# Patient Record
Sex: FEMALE | Race: WHITE | NOT HISPANIC OR LATINO | Employment: FULL TIME | URBAN - METROPOLITAN AREA
[De-identification: names, ages, dates, MRNs, and addresses within clinical notes are randomized per-mention and may not be internally consistent; named-entity substitution may affect disease eponyms.]

---

## 2017-01-02 ENCOUNTER — OFFICE VISIT (OUTPATIENT)
Dept: WOUND CARE | Facility: MEDICAL CENTER | Age: 27
End: 2017-01-02
Attending: ORTHOPAEDIC SURGERY
Payer: COMMERCIAL

## 2017-01-02 DIAGNOSIS — T81.89XS NON-HEALING SURGICAL WOUND, SEQUELA: ICD-10-CM

## 2017-01-02 DIAGNOSIS — A49.02 INFECTION OF WOUND DUE TO METHICILLIN RESISTANT STAPHYLOCOCCUS AUREUS (MRSA): ICD-10-CM

## 2017-01-02 PROCEDURE — A6212 FOAM DRG <=16 SQ IN W/BORDER: HCPCS

## 2017-01-02 PROCEDURE — 97602 WOUND(S) CARE NON-SELECTIVE: CPT

## 2017-01-02 PROCEDURE — 302717 HCHG ABSORBANT-ANTI MICROBIAL 7 DAY

## 2017-01-02 PROCEDURE — A6402 STERILE GAUZE <= 16 SQ IN: HCPCS

## 2017-01-02 RX ORDER — SULFAMETHOXAZOLE AND TRIMETHOPRIM 800; 160 MG/1; MG/1
1 TABLET ORAL 2 TIMES DAILY
Qty: 30 TAB | Refills: 0 | Status: SHIPPED | OUTPATIENT
Start: 2017-01-02 | End: 2017-01-05

## 2017-01-02 NOTE — WOUND TEAM
Advanced Wound Care  [facility]  [address]  [address]  [place] [address]  [phone] Fax: [phone]      Encounter Note:   For Certification Period: 12/14/16 - 1/14/17      Referring Physician: WM NIKIA Teixeira MD [phone]  Primary Physician:    Sweetie English NP    Consulting Physicians:     Dr. JERAD Boo    Wound(s): L&R Bunion  M20.11, M20.12    Start of Care:     12/14/16  Subjective:        HPI:          Patient had R. Foot bunion removed on 10/13/16, has had delayed wound healing, was on antibiotics but has completed abx course at this time. Patient is following up with Dr. Teixeira today.     Pain:   2/10 on 0-10 pain scale    Past Medical History:  Carpal tunnel syndrome    Current Medications:  No current antibiotics  Birth control  Adderall    Allergies:   NKDA    Past Surgical History:   Osteotomy, metatarsal - 10/13/16      Social History:    Never Smoker      Objective:    Tests and Measures: DP and PT pulses strong palpable, Hair growth to RLE present, foot warm and cap refill WNL  12/30/16 c and s taken  Orthotic, protective, supportive devices: No longer in surgical boot per MD, wearing Hookas currently     Wound Characteristics                                                    Location:  R. Medial dorsal foot Initial Evaluation  Date: 12/14/16 12/16/2016 Encounter Note: 1/2/17   Tissue Type and %: 100% dry brown 25% yellow slough, 75% pink  10% yellow slough, 70% red viable, 20% exposed plate   Periwound: Scattered erythema Scattered erythema Moderate  Erythema, dermatitis under tape   Drainage: None Min ss Min ss   Exposed structures NA None Metal plate;    Wound Edges:   Closed intact intact   Odor: None None none   S&S of Infection:   Erythema, delayed healing Slight erythema Erythema, pain, heat to area   Edema: Trace pedal slight Localized     Sensation: Intact intact Intact                      Measurements:  R. Medial Dorsal Foot Initial Evaluation  Date:  12/14/16 Encounter Note: 12/26/2016   Length (cm) 4.5 4.0   Width (cm) 1.3 1.0   Depth (cm) NA 1.0   Area (cm2) 5.85 cm2 4.0 cm 2   Tract/undermine  none        Procedures:  Wound culture obtained today 12/30 and sent to lab - positive for MRSA   Debridement :  Non selective with gauze and cotton tip applicator to remove loose biofilm   Cleansed with:       NS and no rinse foam cleanser to foot                                                                   Periwound protected with: benzoin,    Primary dressing: acti 7, barrier paste and biatain foam,   Other: Tubi E, with hole cut out for tubing  1/2/17 vac hold due to periwound irritation     Patient Education:   Professional Collaboration:  1/4/17 - spoke to Dr Teixeira re infection   1/5/16 pt to see Dr Teixeira followed by an appt here to replace the VAC   1/2/16 Dr Enrrique Graves in to address wound culture and placed on bactrim.    Assessment:      Wound etiology: Surgical    Wound Progress:  Increased viable tissue, increased erythema, wound culture obtained.    Rationale for Treatment: NPWT to assist with granular tissue formation, control exudate    Patient tolerance/compliance: Patient understanding wound care instructions and appears motivated to comply.    Complicating factors: Possible infection    Need for ongoing Advanced Wound Care services: Patient requires CSWD PRN and wound evaluation to monitor for s/s of infection.      Plan:      Treatment Plan and Recommendations:    Frequency: 2x/week      Treatment Goals: STG 2 Weeks  LTG 4 Weeks   Granulation Tissue: % 30 % 60   Decrease Necrotic Tissue to: % 70 % 40   Wound Phase:  proliferation proliferation   Decrease Size by: % 0 % 20   Periwound:  intact intact   Decrease tracts/undermining by: % NA % NA   Decrease Pain:  NA NA                        At the time of each visit a thorough assessment of the patient is completed to assure the  appropriateness of our plan of care.  The dressings or modalities may  need to be adapted   from the original plan to address any significant changes in the wound environment.          Clinician Signature:_______________________________Date__________________      Physician Signature:______________________________Date:__________________

## 2017-01-04 ENCOUNTER — NON-PROVIDER VISIT (OUTPATIENT)
Dept: WOUND CARE | Facility: MEDICAL CENTER | Age: 27
End: 2017-01-04
Attending: ORTHOPAEDIC SURGERY
Payer: COMMERCIAL

## 2017-01-04 PROCEDURE — 97602 WOUND(S) CARE NON-SELECTIVE: CPT

## 2017-01-04 PROCEDURE — A6402 STERILE GAUZE <= 16 SQ IN: HCPCS

## 2017-01-04 PROCEDURE — A6212 FOAM DRG <=16 SQ IN W/BORDER: HCPCS

## 2017-01-04 NOTE — WOUND TEAM
Advanced Wound Care  [facility]  [address]  [address]  [place] [address]  [phone] Fax: [phone]      Encounter Note:   For Certification Period: 12/14/16 - 1/14/17      Referring Physician: WM NIKIA Teixeira MD [phone]  Primary Physician:    Sweetie English NP    Consulting Physicians:     Dr. JERAD Boo    Wound(s): L&R Bunion  M20.11, M20.12    Start of Care:     12/14/16  Subjective:        HPI:   Patient had R. Foot bunion removed on 10/13/16, has had delayed wound healing, was on antibiotics but has completed abx course at this time. Patient is following up with Dr. Teixeira today.     Pain:  Generalized pain; no pain in wound.     Current Medications:  Patient on abx as of 1/2/17-was not able to tolerate and is now off of all meds per Dr. Teixeira.    Allergies:   NKDA      Objective:    Tests and Measures: DP and PT pulses strong palpable, Hair growth to RLE present, foot warm and cap refill WNL  12/30/16 c and s taken  Orthotic, protective, supportive devices: No longer in surgical boot per MD, wearing Hookas currently     Wound Characteristics                                                    Location:  R. Medial dorsal foot Initial Evaluation  Date: 12/14/16 12/16/2016 Encounter Note: 1/4/17   Tissue Type and %: 100% dry brown 25% yellow slough, 75% pink  10% yellow slough, 70% red viable, 20% exposed plate   Periwound: Scattered erythema Scattered erythema Moderate  Erythema, dermatitis under tape   Drainage: None Min ss Min ss   Exposed structures NA None Metal plate   Wound Edges:   Closed intact intact   Odor: None None none   S&S of Infection:   Erythema, delayed healing Slight erythema Erythema, wound + for MRSA   Edema: Trace pedal slight Localized     Sensation: Intact intact Intact                      Measurements:  R. Medial Dorsal Foot Initial Evaluation  Date: 12/14/16 Encounter Note: 12/26/2016 Encounter Note:  1/4/17   Length (cm) 4.5 4.0 3.8   Width (cm) 1.3 1.0  1.0   Depth (cm) NA 1.0 0.3   Area (cm2) 5.85 cm2 4.0 cm 2 3.8   Tract/undermine  none None        Procedures:  Wound culture obtained today 12/30 and sent to lab - positive for MRSA   Debridement :  Non selective with NS, gauze and cotton tipped applicator to remove loose biofilm   Cleansed with:   NS                                                                 Periwound protected with: No sting skin prep, zinc moisture barrier cream    Primary dressing: acticoat 7              Secondary dressing: Ad foam    Other: Tubi E  1/4/17 vac held again per Ana THOMPSON due to infection     Patient Education: Reinforced pt instr on ss infection - erythema, edema, localized heat, fever/chills/N+V, when to call MD/go to ER. Explained to patient rationale for holding the wound vac, discussed the wound culture with patient and DONNA Jama. Patient states that Dr. Teixeira is holding all of her meds at this time pending surgery on Tuesday 1/10/17 to remove hardware in foot. Patient verbalizes understanding to all instructions.   Called Dr. Teixeira to report patient has MRSA infection and that we are holding the wound vac at this time. Spoke to Dr. Teixeira' MA Alondra who stated that Dr. Teixeira is aware of MRSA infection and that he wants to have her off of all medications prior to her surgery. Patient states that Dr. Teixeira advised her to call at any hour if needed. Let patient know that if fevers start to get higher than 101.5 or she has nausea and/or vomiting that she needs to go to the ER immediately. Patient verbalizes understanding to all instructions. Faxed over lab report for the wound culture.      Professional Collaboration: None today    1/5/16 pt to see Dr Teixeira followed by an appt here to replace the VAC    Assessment:      Wound etiology: Surgical    Wound Progress:  Increased viable tissue, increased erythema, wound culture + for MRSA.    Rationale for Treatment: Acticoat 7 for antimicrobial  properties    Patient tolerance/compliance: Patient understanding wound care instructions and appears motivated to comply.    Complicating factors: Possible infection    Need for ongoing Advanced Wound Care services: Patient requires CSWD PRN and wound evaluation to monitor for s/s of infection.      Plan:      Treatment Plan and Recommendations:    Frequency: 2x/week      Treatment Goals: STG 2 Weeks  LTG 4 Weeks   Granulation Tissue: % 30 % 60   Decrease Necrotic Tissue to: % 70 % 40   Wound Phase:  proliferation proliferation   Decrease Size by: % 0 % 20   Periwound:  intact intact   Decrease tracts/undermining by: % NA % NA   Decrease Pain:  NA NA                        At the time of each visit a thorough assessment of the patient is completed to assure the  appropriateness of our plan of care.  The dressings or modalities may need to be adapted   from the original plan to address any significant changes in the wound environment.          Clinician Signature:_______________________________Date__________________      Physician Signature:______________________________Date:__________________

## 2017-01-05 ENCOUNTER — APPOINTMENT (OUTPATIENT)
Dept: WOUND CARE | Facility: MEDICAL CENTER | Age: 27
End: 2017-01-05
Attending: ORTHOPAEDIC SURGERY
Payer: COMMERCIAL

## 2017-01-05 DIAGNOSIS — Z01.812 PRE-OPERATIVE LABORATORY EXAMINATION: ICD-10-CM

## 2017-01-05 LAB
ALBUMIN SERPL BCP-MCNC: 2.9 G/DL (ref 3.2–4.9)
ALBUMIN/GLOB SERPL: 0.9 G/DL
ALP SERPL-CCNC: 110 U/L (ref 30–99)
ALT SERPL-CCNC: 40 U/L (ref 2–50)
ANION GAP SERPL CALC-SCNC: 7 MMOL/L (ref 0–11.9)
AST SERPL-CCNC: 43 U/L (ref 12–45)
BASOPHILS # BLD AUTO: 0.2 % (ref 0–1.8)
BASOPHILS # BLD: 0.01 K/UL (ref 0–0.12)
BILIRUB SERPL-MCNC: 0.5 MG/DL (ref 0.1–1.5)
BUN SERPL-MCNC: 11 MG/DL (ref 8–22)
CALCIUM SERPL-MCNC: 8.7 MG/DL (ref 8.4–10.2)
CHLORIDE SERPL-SCNC: 107 MMOL/L (ref 96–112)
CO2 SERPL-SCNC: 24 MMOL/L (ref 20–33)
CREAT SERPL-MCNC: 0.71 MG/DL (ref 0.5–1.4)
CRP SERPL HS-MCNC: 6.88 MG/DL (ref 0–0.75)
EOSINOPHIL # BLD AUTO: 0.4 K/UL (ref 0–0.51)
EOSINOPHIL NFR BLD: 7.8 % (ref 0–6.9)
ERYTHROCYTE [DISTWIDTH] IN BLOOD BY AUTOMATED COUNT: 43.8 FL (ref 35.9–50)
ERYTHROCYTE [SEDIMENTATION RATE] IN BLOOD BY WESTERGREN METHOD: 35 MM/HOUR (ref 0–20)
GFR SERPL CREATININE-BSD FRML MDRD: >60 ML/MIN/1.73 M 2
GLOBULIN SER CALC-MCNC: 3.2 G/DL (ref 1.9–3.5)
GLUCOSE SERPL-MCNC: 112 MG/DL (ref 65–99)
HCT VFR BLD AUTO: 37.6 % (ref 37–47)
HGB BLD-MCNC: 12.8 G/DL (ref 12–16)
IMM GRANULOCYTES # BLD AUTO: 0.04 K/UL (ref 0–0.11)
IMM GRANULOCYTES NFR BLD AUTO: 0.8 % (ref 0–0.9)
LYMPHOCYTES # BLD AUTO: 1.02 K/UL (ref 1–4.8)
LYMPHOCYTES NFR BLD: 19.8 % (ref 22–41)
MCH RBC QN AUTO: 30.8 PG (ref 27–33)
MCHC RBC AUTO-ENTMCNC: 34 G/DL (ref 33.6–35)
MCV RBC AUTO: 90.4 FL (ref 81.4–97.8)
MONOCYTES # BLD AUTO: 0.26 K/UL (ref 0–0.85)
MONOCYTES NFR BLD AUTO: 5.1 % (ref 0–13.4)
NEUTROPHILS # BLD AUTO: 3.41 K/UL (ref 2–7.15)
NEUTROPHILS NFR BLD: 66.3 % (ref 44–72)
NRBC # BLD AUTO: 0 K/UL
NRBC BLD AUTO-RTO: 0 /100 WBC
PLATELET # BLD AUTO: 225 K/UL (ref 164–446)
PMV BLD AUTO: 11 FL (ref 9–12.9)
POTASSIUM SERPL-SCNC: 3.6 MMOL/L (ref 3.6–5.5)
PROT SERPL-MCNC: 6.1 G/DL (ref 6–8.2)
RBC # BLD AUTO: 4.16 M/UL (ref 4.2–5.4)
SODIUM SERPL-SCNC: 138 MMOL/L (ref 135–145)
WBC # BLD AUTO: 5.1 K/UL (ref 4.8–10.8)

## 2017-01-05 PROCEDURE — 80053 COMPREHEN METABOLIC PANEL: CPT

## 2017-01-05 PROCEDURE — 86140 C-REACTIVE PROTEIN: CPT

## 2017-01-05 PROCEDURE — 36415 COLL VENOUS BLD VENIPUNCTURE: CPT

## 2017-01-05 PROCEDURE — 85652 RBC SED RATE AUTOMATED: CPT

## 2017-01-05 PROCEDURE — 85025 COMPLETE CBC W/AUTO DIFF WBC: CPT

## 2017-01-05 RX ORDER — MULTIVIT WITH MINERALS/LUTEIN
TABLET ORAL DAILY
COMMUNITY
End: 2022-10-13

## 2017-01-05 RX ORDER — LEVONORGESTREL AND ETHINYL ESTRADIOL 100-20(84)
KIT ORAL DAILY
COMMUNITY
End: 2022-10-13

## 2017-01-05 RX ORDER — ACETAMINOPHEN 325 MG/1
650 TABLET ORAL EVERY 4 HOURS PRN
COMMUNITY
End: 2017-04-12

## 2017-01-05 RX ORDER — AMPHETAMINE SULFATE 5 MG/1
2 TABLET ORAL DAILY
COMMUNITY
End: 2017-04-12

## 2017-01-06 NOTE — WOUND TEAM
Pt was waiting on hold to speak to clinician about abx.  Pt was no longer on the line when clinican came to the phone.  Returned called number on file for pt and left a message requesting a call back.

## 2017-01-07 ENCOUNTER — NON-PROVIDER VISIT (OUTPATIENT)
Dept: WOUND CARE | Facility: MEDICAL CENTER | Age: 27
End: 2017-01-07
Attending: ORTHOPAEDIC SURGERY
Payer: COMMERCIAL

## 2017-01-07 PROCEDURE — A6457 TUBULAR DRESSING: HCPCS

## 2017-01-07 PROCEDURE — 97602 WOUND(S) CARE NON-SELECTIVE: CPT

## 2017-01-07 PROCEDURE — A6402 STERILE GAUZE <= 16 SQ IN: HCPCS

## 2017-01-07 PROCEDURE — 303972 HCHG HYPAFIX RET DRST 18SQ PC"

## 2017-01-07 NOTE — WOUND TEAM
Advanced Wound Care  [facility]  [address]  [address]  [place] [address]  [phone] Fax: [phone]      Encounter Note:   For Certification Period: 12/14/16 - 1/14/17      Referring Physician: WM NIKIA Teixeira MD [phone]  Primary Physician:    Sweetie English NP    Consulting Physicians:     Dr. JERAD Boo    Wound(s): L&R Bunion  M20.11, M20.12    Start of Care:     12/14/16  Subjective:        HPI:   Patient had R. Foot bunion removed on 10/13/16, has had delayed wound healing, was on antibiotics but has completed abx course at this time. Patient is following up with Dr. Teixeira today.     Pain:  Denies pain today    Current Medications:  Patient on abx as of 1/2/17-was not able to tolerate and is now off of all meds per Dr. Teixeira.    Allergies:   NKDA      Objective:    Tests and Measures: DP and PT pulses strong palpable, Hair growth to RLE present, foot warm and cap refill WNL  12/30/16 c and s taken  Orthotic, protective, supportive devices: No longer in surgical boot per MD, wearing Hookas currently     Wound Characteristics                                                    Location:  R. Medial dorsal foot Initial Evaluation  Date: 12/14/16 12/16/2016 Encounter Note: 1/7/17   Tissue Type and %: 100% dry brown 25% yellow slough, 75% pink  15% yellow slough, 75% red viable, 10% exposed plate   Periwound: Scattered erythema Scattered erythema Moderate  erythema, edema    Drainage: None Min ss Min ss   Exposed structures NA None Metal plate   Wound Edges:   Closed intact intact   Odor: None None None   S&S of Infection:   Erythema, delayed healing Slight erythema Erythema, edema, wound + for MRSA   Edema: Trace pedal slight Localized 1+     Sensation: Intact intact Intact                      Measurements:  R. Medial Dorsal Foot Initial Evaluation  Date: 12/14/16 Encounter Note: 12/26/2016 Encounter Note:  1/4/17   Length (cm) 4.5 4.0 3.8   Width (cm) 1.3 1.0 1.0   Depth (cm) NA 1.0  0.3   Area (cm2) 5.85 cm2 4.0 cm 2 3.8   Tract/undermine  none None        Procedures:  Wound culture obtained today 12/30 and sent to lab - positive for MRSA   Debridement :  Non selective with NS, gauze and cotton tipped applicator to remove loose biofilm   Cleansed with:   NS                                                                 Periwound protected with: No sting skin prep, zinc moisture barrier cream    Primary dressing: Acticoat 7              Secondary dressing: Ad foam    Other: Tubi E  1/4/17 vac held again per Ana THOMPSON due to infection     Patient Education: Reinforced pt instr on ss infection - erythema, edema, localized heat, fever/chills/N+V, when to call MD/go to ER. Patient states that Dr. Teixeira is holding all of her meds at this time pending surgery on Tuesday 1/10/17 to remove hardware in foot. Patient verbalizes understanding to all instructions.   Patient states that Dr. Teixeira is on call if needed. Let patient know that if fevers start to get higher than 101.5, has nausea and/or vomiting, pain increases in wound, if the wound becomes more reddened that she needs to go to the ER immediately. Patient verbalizes understanding to all instructions.     Professional Collaboration: None today    Assessment:      Wound etiology: Surgical    Wound Progress:  Increased viable tissue, increased erythema, wound culture + for MRSA.    Rationale for Treatment: Acticoat 7 for antimicrobial properties    Patient tolerance/compliance: Patient understanding wound care instructions and appears motivated to comply.    Complicating factors: Possible infection    Need for ongoing Advanced Wound Care services: Patient requires CSWD PRN and wound evaluation to monitor for s/s of infection.      Plan:      Treatment Plan and Recommendations:    Frequency: 2x/week      Treatment Goals: STG 2 Weeks  LTG 4 Weeks   Granulation Tissue: % 30 % 60   Decrease Necrotic Tissue to: % 70 % 40   Wound Phase:   proliferation proliferation   Decrease Size by: % 0 % 20   Periwound:  intact intact   Decrease tracts/undermining by: % NA % NA   Decrease Pain:  NA NA                        At the time of each visit a thorough assessment of the patient is completed to assure the  appropriateness of our plan of care.  The dressings or modalities may need to be adapted   from the original plan to address any significant changes in the wound environment.          Clinician Signature:_______________________________Date__________________      Physician Signature:______________________________Date:__________________

## 2017-01-09 ENCOUNTER — APPOINTMENT (OUTPATIENT)
Dept: WOUND CARE | Facility: MEDICAL CENTER | Age: 27
End: 2017-01-09
Attending: ORTHOPAEDIC SURGERY
Payer: COMMERCIAL

## 2017-01-09 PROCEDURE — A6402 STERILE GAUZE <= 16 SQ IN: HCPCS

## 2017-01-09 PROCEDURE — A6212 FOAM DRG <=16 SQ IN W/BORDER: HCPCS

## 2017-01-09 PROCEDURE — 97602 WOUND(S) CARE NON-SELECTIVE: CPT

## 2017-01-09 PROCEDURE — A6457 TUBULAR DRESSING: HCPCS

## 2017-01-09 NOTE — WOUND TEAM
"Advanced Wound Care  [facility]  [address]  [address]  [place] [address]  [phone] Fax: [phone]      Encounter Note:   For Certification Period: 12/14/16 - 1/14/17      Referring Physician: WM NIKIA Teixeira MD [phone]  Primary Physician:    Sweetie English NP    Consulting Physicians:     Dr. JERAD Boo    Wound(s): L&R Bunion  M20.11, M20.12    Start of Care:     12/14/16  Subjective:        01/09/2017 - \"Dr. Teixeira is doing surgery tomorrow to take the hardware out\"    HPI:   Patient had R. Foot bunion removed on 10/13/16, has had delayed wound healing, was on antibiotics but has completed abx course at this time. Patient is following up with Dr. Teixeira today.     Pain:  Denies pain today    Current Medications:  Patient on abx as of 1/2/17-was not able to tolerate and is now off of all meds per Dr. Teixeira.    Allergies:   NKDA      Objective:    Tests and Measures: DP and PT pulses strong palpable, Hair growth to RLE present, foot warm and cap refill WNL  12/30/16 c and s taken  Orthotic, protective, supportive devices: No longer in surgical boot per MD, wearing Hookas currently     Wound Characteristics                                                    Location:  R. Medial dorsal foot Initial Evaluation  Date: 12/14/16 12/16/2016 Encounter Note: 1/9/17   Tissue Type and %: 100% dry brown 25% yellow slough, 75% pink  15% yellow slough, 75% red viable, 10% exposed plate   Periwound: Scattered erythema Scattered erythema minimal  erythema, minimal local edema    Drainage: None Min ss Min ss   Exposed structures NA None Hardware exposed   Wound Edges:   Closed intact intact   Odor: None None None   S&S of Infection:   Erythema, delayed healing Slight erythema Erythema, edema, wound + for MRSA   Edema: Trace pedal slight Localized 1+     Sensation: Intact intact Intact                      Measurements:  R. Medial Dorsal Foot Initial Evaluation  Date: 12/14/16 Encounter Note: " 12/26/2016 Encounter Note:  1/9/17   Length (cm) 4.5 4.0 3.5   Width (cm) 1.3 1.0 0.7   Depth (cm) NA 1.0 0.3   Area (cm2) 5.85 cm2 4.0 cm 2 2.45cm2   Tract/undermine  none None        Procedures:  Wound culture obtained today 12/30 and sent to lab - positive for MRSA   Debridement :  Non selective with NS, gauze and cotton tipped applicator to remove loose biofilm   Cleansed with:   NS                                                                 Periwound protected with: No sting skin prep, zinc moisture barrier cream    Primary dressing: Acticoat 7              Secondary dressing: Ad foam    Other: Tubi D  1/9/17 vac held - pt having surgery tomorrow     Patient Education: Pt instr we will likely use VAC after her surgery, unless MD is able to close her wound for primary intention healing. Instr her to call Dr. Puneet PEARSON, ask if she should bring VAC to surgical center tomorrow.    Reinforced pt instr on ss infection - erythema, edema, localized heat, fever/chills/N+V, when to call MD/go to ER. Patient states that Dr. Teixeira is holding all of her meds at this time pending surgery on Tuesday 1/10/17 to remove hardware in foot. Patient verbalizes understanding to all instructions.   Patient states that Dr. Teixeira is on call if needed. Let patient know that if fevers start to get higher than 101.5, has nausea and/or vomiting, pain increases in wound, if the wound becomes more reddened that she needs to go to the ER immediately. Patient verbalizes understanding to all instructions.     Professional Collaboration: None today    Assessment:      Wound etiology: Surgical    Wound Progress:  Increased viable tissue, increased erythema, wound culture + for MRSA.    Rationale for Treatment: Acticoat 7 for antimicrobial properties    Patient tolerance/compliance: Patient understanding wound care instructions and appears motivated to comply.    Complicating factors: Possible infection    Need for ongoing Advanced Wound Care  services: Patient requires CSWD PRN and wound evaluation to monitor for s/s of infection.      Plan:      Treatment Plan and Recommendations:    Frequency: 2x/week      Treatment Goals: STG 2 Weeks  LTG 4 Weeks   Granulation Tissue: % 30 % 60   Decrease Necrotic Tissue to: % 70 % 40   Wound Phase:  proliferation proliferation   Decrease Size by: % 0 % 20   Periwound:  intact intact   Decrease tracts/undermining by: % NA % NA   Decrease Pain:  NA NA                        At the time of each visit a thorough assessment of the patient is completed to assure the  appropriateness of our plan of care.  The dressings or modalities may need to be adapted   from the original plan to address any significant changes in the wound environment.          Clinician Signature:_______________________________Date__________________      Physician Signature:______________________________Date:__________________

## 2017-01-10 ENCOUNTER — HOSPITAL ENCOUNTER (INPATIENT)
Facility: MEDICAL CENTER | Age: 27
LOS: 1 days | DRG: 908 | End: 2017-01-11
Attending: ORTHOPAEDIC SURGERY | Admitting: ORTHOPAEDIC SURGERY
Payer: COMMERCIAL

## 2017-01-10 ENCOUNTER — APPOINTMENT (OUTPATIENT)
Dept: RADIOLOGY | Facility: MEDICAL CENTER | Age: 27
DRG: 908 | End: 2017-01-10
Attending: ORTHOPAEDIC SURGERY
Payer: COMMERCIAL

## 2017-01-10 ENCOUNTER — APPOINTMENT (OUTPATIENT)
Dept: RADIOLOGY | Facility: MEDICAL CENTER | Age: 27
DRG: 908 | End: 2017-01-10
Attending: INTERNAL MEDICINE
Payer: COMMERCIAL

## 2017-01-10 PROBLEM — T84.398A: Status: ACTIVE | Noted: 2017-01-10

## 2017-01-10 LAB
ANION GAP SERPL CALC-SCNC: 9 MMOL/L (ref 0–11.9)
BUN SERPL-MCNC: 9 MG/DL (ref 8–22)
CALCIUM SERPL-MCNC: 8.8 MG/DL (ref 8.4–10.2)
CHLORIDE SERPL-SCNC: 102 MMOL/L (ref 96–112)
CO2 SERPL-SCNC: 25 MMOL/L (ref 20–33)
CREAT SERPL-MCNC: 0.78 MG/DL (ref 0.5–1.4)
GFR SERPL CREATININE-BSD FRML MDRD: >60 ML/MIN/1.73 M 2
GLUCOSE SERPL-MCNC: 111 MG/DL (ref 65–99)
HCG UR QL: NEGATIVE
POTASSIUM SERPL-SCNC: 4.2 MMOL/L (ref 3.6–5.5)
SODIUM SERPL-SCNC: 136 MMOL/L (ref 135–145)
SP GR UR REFRACTOMETRY: 1.02

## 2017-01-10 PROCEDURE — 700111 HCHG RX REV CODE 636 W/ 250 OVERRIDE (IP): Performed by: ORTHOPAEDIC SURGERY

## 2017-01-10 PROCEDURE — 160039 HCHG SURGERY MINUTES - EA ADDL 1 MIN LEVEL 3: Performed by: ORTHOPAEDIC SURGERY

## 2017-01-10 PROCEDURE — 110382 HCHG SHELL REV 271: Performed by: ORTHOPAEDIC SURGERY

## 2017-01-10 PROCEDURE — 160009 HCHG ANES TIME/MIN: Performed by: ORTHOPAEDIC SURGERY

## 2017-01-10 PROCEDURE — 87205 SMEAR GRAM STAIN: CPT

## 2017-01-10 PROCEDURE — 110371 HCHG SHELL REV 272: Performed by: ORTHOPAEDIC SURGERY

## 2017-01-10 PROCEDURE — 36415 COLL VENOUS BLD VENIPUNCTURE: CPT

## 2017-01-10 PROCEDURE — 307732 DRAIN ACCESSORY KIT,15FR CHNL: Performed by: ORTHOPAEDIC SURGERY

## 2017-01-10 PROCEDURE — 87015 SPECIMEN INFECT AGNT CONCNTJ: CPT

## 2017-01-10 PROCEDURE — 700102 HCHG RX REV CODE 250 W/ 637 OVERRIDE(OP): Performed by: ORTHOPAEDIC SURGERY

## 2017-01-10 PROCEDURE — 501838 HCHG SUTURE GENERAL: Performed by: ORTHOPAEDIC SURGERY

## 2017-01-10 PROCEDURE — 700105 HCHG RX REV CODE 258: Performed by: ORTHOPAEDIC SURGERY

## 2017-01-10 PROCEDURE — 160036 HCHG PACU - EA ADDL 30 MINS PHASE I: Performed by: ORTHOPAEDIC SURGERY

## 2017-01-10 PROCEDURE — 700101 HCHG RX REV CODE 250

## 2017-01-10 PROCEDURE — 87186 SC STD MICRODIL/AGAR DIL: CPT

## 2017-01-10 PROCEDURE — 500049 HCHG BANDAGE, ACE ELASTIC 2: Performed by: ORTHOPAEDIC SURGERY

## 2017-01-10 PROCEDURE — 0QBN0ZZ EXCISION OF RIGHT METATARSAL, OPEN APPROACH: ICD-10-PCS | Performed by: ORTHOPAEDIC SURGERY

## 2017-01-10 PROCEDURE — 700111 HCHG RX REV CODE 636 W/ 250 OVERRIDE (IP)

## 2017-01-10 PROCEDURE — 770006 HCHG ROOM/CARE - MED/SURG/GYN SEMI*

## 2017-01-10 PROCEDURE — 160028 HCHG SURGERY MINUTES - 1ST 30 MINS LEVEL 3: Performed by: ORTHOPAEDIC SURGERY

## 2017-01-10 PROCEDURE — A4606 OXYGEN PROBE USED W OXIMETER: HCPCS | Performed by: ORTHOPAEDIC SURGERY

## 2017-01-10 PROCEDURE — 307736 CANISTER GO RENASYS 300ML: Performed by: ORTHOPAEDIC SURGERY

## 2017-01-10 PROCEDURE — 160035 HCHG PACU - 1ST 60 MINS PHASE I: Performed by: ORTHOPAEDIC SURGERY

## 2017-01-10 PROCEDURE — 3E0T3CZ INTRODUCTION OF REGIONAL ANESTHETIC INTO PERIPHERAL NERVES AND PLEXI, PERCUTANEOUS APPROACH: ICD-10-PCS | Performed by: ANESTHESIOLOGY

## 2017-01-10 PROCEDURE — 160022 HCHG BLOCK: Performed by: ORTHOPAEDIC SURGERY

## 2017-01-10 PROCEDURE — A9270 NON-COVERED ITEM OR SERVICE: HCPCS | Performed by: ORTHOPAEDIC SURGERY

## 2017-01-10 PROCEDURE — 87077 CULTURE AEROBIC IDENTIFY: CPT

## 2017-01-10 PROCEDURE — 160048 HCHG OR STATISTICAL LEVEL 1-5: Performed by: ORTHOPAEDIC SURGERY

## 2017-01-10 PROCEDURE — 160002 HCHG RECOVERY MINUTES (STAT): Performed by: ORTHOPAEDIC SURGERY

## 2017-01-10 PROCEDURE — 700105 HCHG RX REV CODE 258: Performed by: INTERNAL MEDICINE

## 2017-01-10 PROCEDURE — 0YP90YZ REMOVAL OF OTHER DEVICE FROM RIGHT LOWER EXTREMITY, OPEN APPROACH: ICD-10-PCS | Performed by: ORTHOPAEDIC SURGERY

## 2017-01-10 PROCEDURE — A6550 NEG PRES WOUND THER DRSG SET: HCPCS | Performed by: ORTHOPAEDIC SURGERY

## 2017-01-10 PROCEDURE — 87075 CULTR BACTERIA EXCEPT BLOOD: CPT

## 2017-01-10 PROCEDURE — 81025 URINE PREGNANCY TEST: CPT

## 2017-01-10 PROCEDURE — 80048 BASIC METABOLIC PNL TOTAL CA: CPT

## 2017-01-10 PROCEDURE — 87070 CULTURE OTHR SPECIMN AEROBIC: CPT

## 2017-01-10 PROCEDURE — 700111 HCHG RX REV CODE 636 W/ 250 OVERRIDE (IP): Performed by: INTERNAL MEDICINE

## 2017-01-10 RX ORDER — ACETAMINOPHEN 500 MG
1000 TABLET ORAL EVERY 6 HOURS
Status: DISCONTINUED | OUTPATIENT
Start: 2017-01-10 | End: 2017-01-11 | Stop reason: HOSPADM

## 2017-01-10 RX ORDER — ONDANSETRON 2 MG/ML
4 INJECTION INTRAMUSCULAR; INTRAVENOUS EVERY 4 HOURS PRN
Status: DISCONTINUED | OUTPATIENT
Start: 2017-01-10 | End: 2017-01-11 | Stop reason: HOSPADM

## 2017-01-10 RX ORDER — OXYCODONE HYDROCHLORIDE 5 MG/1
5 TABLET ORAL
Status: DISCONTINUED | OUTPATIENT
Start: 2017-01-10 | End: 2017-01-11 | Stop reason: HOSPADM

## 2017-01-10 RX ORDER — LEVONORGESTREL AND ETHINYL ESTRADIOL 100-20(84)
1 KIT ORAL DAILY
Status: DISCONTINUED | OUTPATIENT
Start: 2017-01-11 | End: 2017-01-11 | Stop reason: HOSPADM

## 2017-01-10 RX ORDER — ACETAMINOPHEN 325 MG/1
650 TABLET ORAL EVERY 4 HOURS PRN
Status: DISCONTINUED | OUTPATIENT
Start: 2017-01-10 | End: 2017-01-11 | Stop reason: HOSPADM

## 2017-01-10 RX ORDER — SODIUM CHLORIDE, SODIUM LACTATE, POTASSIUM CHLORIDE, CALCIUM CHLORIDE 600; 310; 30; 20 MG/100ML; MG/100ML; MG/100ML; MG/100ML
1000 INJECTION, SOLUTION INTRAVENOUS CONTINUOUS
Status: DISCONTINUED | OUTPATIENT
Start: 2017-01-10 | End: 2017-01-11 | Stop reason: HOSPADM

## 2017-01-10 RX ORDER — OXYCODONE HYDROCHLORIDE 5 MG/1
2.5 TABLET ORAL
Status: DISCONTINUED | OUTPATIENT
Start: 2017-01-10 | End: 2017-01-11 | Stop reason: HOSPADM

## 2017-01-10 RX ORDER — AMPHETAMINE SULFATE 5 MG/1
2 TABLET ORAL DAILY
Status: DISCONTINUED | OUTPATIENT
Start: 2017-01-11 | End: 2017-01-11 | Stop reason: HOSPADM

## 2017-01-10 RX ORDER — LIDOCAINE HYDROCHLORIDE 10 MG/ML
INJECTION, SOLUTION INFILTRATION; PERINEURAL
Status: COMPLETED
Start: 2017-01-10 | End: 2017-01-10

## 2017-01-10 RX ORDER — DIPHENHYDRAMINE HYDROCHLORIDE 50 MG/ML
INJECTION INTRAMUSCULAR; INTRAVENOUS
Status: COMPLETED
Start: 2017-01-10 | End: 2017-01-10

## 2017-01-10 RX ADMIN — LIDOCAINE HYDROCHLORIDE 0.1 ML: 10 INJECTION, SOLUTION INFILTRATION; PERINEURAL at 09:45

## 2017-01-10 RX ADMIN — DIPHENHYDRAMINE HYDROCHLORIDE 25 MG: 50 INJECTION, SOLUTION INTRAMUSCULAR; INTRAVENOUS at 11:34

## 2017-01-10 RX ADMIN — VANCOMYCIN HYDROCHLORIDE 700 MG: 10 INJECTION, POWDER, LYOPHILIZED, FOR SOLUTION INTRAVENOUS at 14:01

## 2017-01-10 RX ADMIN — SODIUM CHLORIDE, POTASSIUM CHLORIDE, SODIUM LACTATE AND CALCIUM CHLORIDE 1000 ML: 600; 310; 30; 20 INJECTION, SOLUTION INTRAVENOUS at 09:45

## 2017-01-10 RX ADMIN — DAPTOMYCIN 500 MG: 500 INJECTION, POWDER, LYOPHILIZED, FOR SOLUTION INTRAVENOUS at 16:19

## 2017-01-10 RX ADMIN — ACETAMINOPHEN 1000 MG: 500 TABLET ORAL at 23:47

## 2017-01-10 ASSESSMENT — LIFESTYLE VARIABLES
EVER HAD A DRINK FIRST THING IN THE MORNING TO STEADY YOUR NERVES TO GET RID OF A HANGOVER: NO
HOW MANY TIMES IN THE PAST YEAR HAVE YOU HAD 5 OR MORE DRINKS IN A DAY: 1
AVERAGE NUMBER OF DAYS PER WEEK YOU HAVE A DRINK CONTAINING ALCOHOL: 1
ON A TYPICAL DAY WHEN YOU DRINK ALCOHOL HOW MANY DRINKS DO YOU HAVE: 1
TOTAL SCORE: 0
TOTAL SCORE: 0
ALCOHOL_USE: YES
EVER_SMOKED: NEVER
CONSUMPTION TOTAL: POSITIVE
HAVE YOU EVER FELT YOU SHOULD CUT DOWN ON YOUR DRINKING: NO
EVER FELT BAD OR GUILTY ABOUT YOUR DRINKING: NO
TOTAL SCORE: 0
HAVE PEOPLE ANNOYED YOU BY CRITICIZING YOUR DRINKING: NO

## 2017-01-10 ASSESSMENT — PAIN SCALES - GENERAL
PAINLEVEL_OUTOF10: 0

## 2017-01-10 NOTE — IP AVS SNAPSHOT
Dotstudioz Access Code: [email or web address]  Expires: 2/10/2017  3:50 PM    Dotstudioz  A secure, online tool to manage your health information     [employer] Dotstudioz® is a secure, online tool that connects you to your personalized health information from the privacy of your home -- day or night - making it very easy for you to manage your healthcare. Once the activation process is completed, you can even access your medical information using the Dotstudioz cammie, which is available for free in the Apple Cammie store or Google Play store.     Dotstudioz provides the following levels of access (as shown below):   My Chart Features   [facility] Primary Care Doctor [facility]  Specialists [facility]  Urgent  Care Non-[facility]  Primary Care  Doctor   Email your healthcare team securely and privately 24/7 X X X X   Manage appointments: schedule your next appointment; view details of past/upcoming appointments X      Request prescription refills. X      View recent personal medical records, including lab and immunizations X X X X   View health record, including health history, allergies, medications X X X X   Read reports about your outpatient visits, procedures, consult and ER notes X X X X   See your discharge summary, which is a recap of your hospital and/or ER visit that includes your diagnosis, lab results, and care plan. X X       How to register for Dotstudioz:  1. Go to  [email or web address].  2. Click on the Sign Up Now box, which takes you to the New Member Sign Up page. You will need to provide the following information:  a. Enter your Dotstudioz Access Code exactly as it appears at the top of this page. (You will not need to use this code after you’ve completed the sign-up process. If you do not sign up before the expiration date, you must request a new code.)   b. Enter your date of birth.   c. Enter your home email address.   d. Click Submit, and follow the next screen’s instructions.  3. Create a Dotstudioz ID. This will be your Dotstudioz  login ID and cannot be changed, so think of one that is secure and easy to remember.  4. Create a Impact password. You can change your password at any time.  5. Enter your Password Reset Question and Answer. This can be used at a later time if you forget your password.   6. Enter your e-mail address. This allows you to receive e-mail notifications when new information is available in Impact.  7. Click Sign Up. You can now view your health information.    For assistance activating your Impact account, call [phone]

## 2017-01-10 NOTE — IP AVS SNAPSHOT
" After Visit Summary                                                                                                                  Name:Merna Ríos  Medical Record Number:[id number]  CSN: [id number]    YOB: 1990   Age: 26 y.o.  Sex: female  HT:1.753 m (5' 9\") WT: 69.3 kg (152 lb 12.5 oz)          Admit Date: 1/10/2017     Discharge Date:   Today's Date: 1/11/2017  Attending Doctor:  Kali Teixeira M*                  Allergies:  Cephalexin and Bactrim ds            Discharge Instructions       Discharge Instructions    Discharged to home by car with relative. Discharged via wheelchair, hospital escort: Yes.  Special equipment needed: Wound VAC    Be sure to schedule a follow-up appointment with your primary care doctor or any specialists as instructed.     Discharge Plan:   Influenza Vaccine Indication: Patient Refuses    I understand that a diet low in cholesterol, fat, and sodium is recommended for good health. Unless I have been given specific instructions below for another diet, I accept this instruction as my diet prescription.   Other diet: n/a    Special Instructions: None    · Is patient discharged on Warfarin / Coumadin?   No     · Is patient Post Blood Transfusion?  No    Depression / Suicide Risk    As you are discharged from this [facility] facility, it is important to learn how to keep safe from harming yourself.    Recognize the warning signs:  · Abrupt changes in personality, positive or negative- including increase in energy   · Giving away possessions  · Change in eating patterns- significant weight changes-  positive or negative  · Change in sleeping patterns- unable to sleep or sleeping all the time   · Unwillingness or inability to communicate  · Depression  · Unusual sadness, discouragement and loneliness  · Talk of wanting to die  · Neglect of personal appearance   · Rebelliousness- reckless behavior  · Withdrawal from people/activities they love  · Confusion- " "inability to concentrate     If you or a loved one observes any of these behaviors or has concerns about self-harm, here's what you can do:  · Talk about it- your feelings and reasons for harming yourself  · Remove any means that you might use to hurt yourself (examples: pills, rope, extension cords, firearm)  · Get professional help from the community (Mental Health, Substance Abuse, psychological counseling)  · Do not be alone:Call your Safe Contact- someone whom you trust who will be there for you.  · Call your local CRISIS HOTLINE 798-4136 or 121-345-9462  · Call your local Children's Mobile Crisis Response Team [place] (204) 341-2788 or www.HYLA Mobile  · Call the toll free National Suicide Prevention Hotlines   · National Suicide Prevention Lifeline 222-827-ITFC (8089)  · National Hope Line Network 800-SUICIDE (548-4553)    PICC Home Guide  A peripherally inserted central catheter (PICC) is a long, thin, flexible tube that is inserted into a vein in the upper arm. It is a form of intravenous (IV) access. It is considered to be a \"central\" line because the tip of the PICC ends in a large vein in your chest. This large vein is called the superior vena cava (SVC). The PICC tip ends in the SVC because there is a lot of blood flow in the SVC. This allows medicines and IV fluids to be quickly distributed throughout the body. The PICC is inserted using a sterile technique by a specially trained nurse or physician. After the PICC is inserted, a chest X-ray exam is done to be sure it is in the correct place.   A PICC may be placed for different reasons, such as:  · To give medicines and liquid nutrition that can only be given through a central line. Examples are:  ¨ Certain antibiotic treatments.  ¨ Chemotherapy.  ¨ Total parenteral nutrition (TPN).  · To take frequent blood samples.  · To give IV fluids and blood products.  · If there is difficulty placing a peripheral intravenous (PIV) catheter.  If taken " "care of properly, a PICC can remain in place for several months. A PICC can also allow a person to go home from the hospital early. Medicine and PICC care can be managed at home by a family member or home health care team.  WHAT PROBLEMS CAN HAPPEN WHEN I HAVE A PICC?  Problems with a PICC can occasionally occur. These may include the following:  · A blood clot (thrombus) forming in or at the tip of the PICC. This can cause the PICC to become clogged. A clot-dissolving medicine called tissue plasminogen activator (tPA) can be given through the PICC to help break up the clot.  · Inflammation of the vein (phlebitis) in which the PICC is placed. Signs of inflammation may include redness, pain at the insertion site, red streaks, or being able to feel a \"cord\" in the vein where the PICC is located.  · Infection in the PICC or at the insertion site. Signs of infection may include fever, chills, redness, swelling, or pus drainage from the PICC insertion site.  · PICC movement (malposition). The PICC tip may move from its original position due to excessive physical activity, forceful coughing, sneezing, or vomiting.  · A break or cut in the PICC. It is important to not use scissors near the PICC.  · Nerve or tendon irritation or injury during PICC insertion.  WHAT SHOULD I KEEP IN MIND ABOUT ACTIVITIES WHEN I HAVE A PICC?  · You may bend your arm and move it freely. If your PICC is near or at the bend of your elbow, avoid activity with repeated motion at the elbow.  · Rest at home for the remainder of the day following PICC line insertion.  · Avoid lifting heavy objects as instructed by your health care provider.  · Avoid using a crutch with the arm on the same side as your PICC. You may need to use a walker.  WHAT SHOULD I KNOW ABOUT MY PICC DRESSING?  · Keep your PICC bandage (dressing) clean and dry to prevent infection.  ¨ Ask your health care provider when you may shower. Ask your health care provider to teach you how " "to wrap the PICC when you do take a shower.  · Change the PICC dressing as instructed by your health care provider.  · Change your PICC dressing if it becomes loose or wet.  WHAT SHOULD I KNOW ABOUT PICC CARE?  · Check the PICC insertion site daily for leakage, redness, swelling, or pain.  · Do not take a bath, swim, or use hot tubs when you have a PICC. Cover PICC line with clear plastic wrap and tape to keep it dry while showering.  · Flush the PICC as directed by your health care provider. Let your health care provider know right away if the PICC is difficult to flush or does not flush. Do not use force to flush the PICC.  · Do not use a syringe that is less than 10 mL to flush the PICC.  · Never pull or tug on the PICC.  · Avoid blood pressure checks on the arm with the PICC.  · Keep your PICC identification card with you at all times.  · Do not take the PICC out yourself. Only a trained clinical professional should remove the PICC.  SEEK IMMEDIATE MEDICAL CARE IF:  · Your PICC is accidentally pulled all the way out. If this happens, cover the insertion site with a bandage or gauze dressing. Do not throw the PICC away. Your health care provider will need to inspect it.  · Your PICC was tugged or pulled and has partially come out. Do not  push the PICC back in.  · There is any type of drainage, redness, or swelling where the PICC enters the skin.  · You cannot flush the PICC, it is difficult to flush, or the PICC leaks around the insertion site when it is flushed.  · You hear a \"flushing\" sound when the PICC is flushed.  · You have pain, discomfort, or numbness in your arm, shoulder, or jaw on the same side as the PICC.  · You feel your heart \"racing\" or skipping beats.  · You notice a hole or tear in the PICC.  · You develop chills or a fever.  MAKE SURE YOU:   · Understand these instructions.  · Will watch your condition.  · Will get help right away if you are not doing well or get worse.     This information " is not intended to replace advice given to you by your health care provider. Make sure you discuss any questions you have with your health care provider.     Document Released: 06/23/2004 Document Revised: 01/08/2016 Document Reviewed: 08/25/2014  [employer] Patient Education ©2016 [employer]      Your appointments     Jan 13, 2017  3:30 PM   Wound New Evaluation with Candace Win R.N.   [facility] ([address] [place] [address])    [address]  [place] [address]   [phone]            Jan 16, 2017 11:00 AM   Wound 30 Minute Procedure with Gilda Sales R.N.   [facility] ([address] [place] [address])    [address]  [place] [address]   [phone]            Jan 18, 2017 11:00 AM   Wound 30 Minute Procedure with Gilda Sales R.N.   [facility] ([address] [place] [address])    [address]  [place] [address]   [phone]            Jan 20, 2017 11:00 AM   Wound 30 Minute Procedure with Gilda Sales R.N.   [facility] ([address] [place] [address])    [address]  [place] [address]   [phone]              Follow-up Information     1. Schedule an appointment as soon as possible for a visit with Kali Teixeira M.D..    Specialty:  Orthopaedics    Contact information    [address]  [address]  [place] [address]  [phone]           Discharge Medication Instructions:    Below are the medications your physician expects you to take upon discharge:    Review all your home medications and newly ordered medications with your doctor and/or pharmacist. Follow medication instructions as directed by your doctor and/or pharmacist.    Please keep your medication list with you and share with your physician.               Medication List      ASK your doctor about these medications        Instructions    acetaminophen 325 MG Tabs   Last time this was given:  1,000 mg on 1/11/2017  3:04 PM   Commonly known as:  TYLENOL    Take 650 mg by mouth every four hours as needed.   Dose:   650 mg       ALEVE PM PO    Take  by mouth every day.       Amphetamine Sulfate 5 MG Tabs    Take 2 Tabs by mouth every day.   Dose:  2 Tab       Levonorgest-Eth Estrad 91-Day 0.1-0.02 & 0.01 MG Tabs    Take  by mouth every day.       * MULTIVITAMIN ADULT PO    Take  by mouth every day.       * EMERGEN-C PINK PO    Take  by mouth as needed.       Vitamin C 1000 MG Tabs    Take  by mouth every day.       * Notice:  This list has 2 medication(s) that are the same as other medications prescribed for you. Read the directions carefully, and ask your doctor or other care provider to review them with you.            Instructions           Diet / Nutrition:    Follow any diet instructions given to you by your doctor or the dietician, including how much salt (sodium) you are allowed each day.    If you are overweight, talk to your doctor about a weight reduction plan.    Activity:    Remain physically active following your doctor's instructions about exercise and activity.    Rest often.     Any time you become even a little tired or short of breath, SIT DOWN and rest.    Worsening Symptoms:    Report any of the following signs and symptoms to the doctor's office immediately:    *Pain of jaw, arm, or neck  *Chest pain not relieved by medication                               *Dizziness or loss of consciousness  *Difficulty breathing even when at rest   *More tired than usual                                       *Bleeding drainage or swelling of surgical site  *Swelling of feet, ankles, legs or stomach                 *Fever (>100ºF)  *Pink or blood tinged sputum  *Weight gain (3lbs/day or 5lbs /week)           *Shock from internal defibrillator (if applicable)  *Palpitations or irregular heartbeats                *Cool and/or numb extremities    Stroke Awareness    Common Risk Factors for Stroke include:    Age  Atrial Fibrillation  Carotid Artery Stenosis  Diabetes Mellitus  Excessive alcohol consumption  High blood  pressure  Overweight   Physical inactivity  Smoking    Warning signs and symptoms of a stroke include:    *Sudden numbness or weakness of the face, arm or leg (especially on one side of the body).  *Sudden confusion, trouble speaking or understanding.  *Sudden trouble seeing in one or both eyes.  *Sudden trouble walking, dizziness, loss of balance or coordination.Sudden severe headache with no known cause.    It is very important to get treatment quickly when a stroke occurs. If you experience any of the above warning signs, call 911 immediately.                   Disclaimer         Quit Smoking / Tobacco Use:    I understand the use of any tobacco products increases my chance of suffering from future heart disease or stroke and could cause other illnesses which may shorten my life. Quitting the use of tobacco products is the single most important thing I can do to improve my health. For further information on smoking / tobacco cessation call a Toll Free Quit Line at 1-216.749.7503 (*National Cancer Christopher) or 1-556.252.9765 (American Lung Association) or you can access the web based program at www.[employer].org.    [place] Tobacco Users Help Line:  (569) 891-1441       Toll Free: 1-708.113.2031  Quit Tobacco Program [facility] (014)422-5180    Crisis Hotline:    [place] Crisis Hotline:  3-291-FYNIOJM or 1-704.905.2412    [place] Crisis Hotline:    1-398.118.3131 or 514-204-5163    Discharge Survey:   Thank you for choosing [facility]. We hope we did everything we could to make your hospital stay a pleasant one. You may be receiving a phone survey and we would appreciate your time and participation in answering the questions. Your input is very valuable to us in our efforts to improve our service to our patients and their families.        My signature on this form indicates that:    1. I have reviewed and understand the above information.  2. My questions regarding this information have  been answered to my satisfaction.  3. I have formulated a plan with my discharge nurse to obtain my prescribed medications for home.                  Disclaimer         __________________________________                     __________       ________                       Patient Signature                                                 Date                    Time

## 2017-01-10 NOTE — OR SURGEON
Immediate Post-Operative Note      PreOp Diagnosis:   Retained hardware with osteomyelitis, right 1st Metatarsal  Retained 5th metatarsal hardware    PostOp Diagnosis: same    Procedure(s) (LRB):  Right 1st metatarsal osteotomy (bunion) site> removal of deep hardware with D&I of osteomyelitis  Right 5th metatarsal hardware removal    Surgeon(s):  Kali Teixeira M.D.    Anesthesiologist/Type of Anesthesia:  Anesthesiologist: Gio Combs M.D./General    Surgical Staff:  Circulator: Ashley Dai R.N.  Scrub Person: Arabella Magaña    Specimen: Deep C/S of 1st Metatarsal        Dict# [id number]    1/10/2017 12:09 PM Kali Teixeira

## 2017-01-10 NOTE — PROGRESS NOTES
"Pharmacy Kinetics Vancomycin Day # 1     1/10/2017    26 y.o. female    Estimated CrCl =  Estimated Creatinine Clearance: 112.2 mL/min (by C-G formula based on Cr of 0.78).     Recent Labs      01/10/17   1343   BUN  9   CREATININE  0.78        Blood pressure 109/63, pulse 55, temperature 36.6 °C (97.8 °F), resp. rate 18, height 1.753 m (5' 9\"), weight 69.3 kg (152 lb 12.5 oz), last menstrual period 01/10/2017, SpO2 99 %.    Temp (24hrs), Av.9 °C (98.5 °F), Min:36.5 °C (97.7 °F), Max:37.2 °C (99 °F)          A/P 1.   Vancomycin 1000 mg IVPB x 1 dose given in OR at 1045 hours              2.  Will complete loading dose with 700 mg Vancomycin IV x 1 for osteo.              3.  BMP ordered for vancomycin dosing.              4.  Vancomycin 1000 mg IV q 8 hours.              5.  Vtr tomorrow at 1330 prior to 1400 hour vancomycin dose.    Xavier Schilling [facility]      "

## 2017-01-10 NOTE — PROGRESS NOTES
1115 To PACU from OR via bed,side rails up x 3 for safety, lungs clear bilaterally, scds on patient and machine operational, dressing to R foot CDI with wound vac in place to 125mmHg continuous setting with small, serosanguinous drainage. RLE elevated on pillow and with bed elevated above heart level. Pt denies pain or nausea. Pt face, chest red and c/o itching. Noted redness to B arms as well; Dr Combs aware and states this is from Vancomycin. Right toes pink/warm with <3 sec cap refill; movement intact.   1130 Pt given cool, wet washcloth for comfort and uses to relieve itching in spots. Non urgent call to Dr Combs regarding continued itching per pt to scalp and bilateral posterior ears to request Benadryl. Denies pain and remains awake.   1145 Redness diminished to chest and shoulders; no redness noted to face or arms. Itching improved per pt. Remains awake without stimulation.   1155 Amy RN House supervisor to obtain isolation room.   1200 Redness/itching continues to diminish. Pt denies pain or nausea; tolerating sips of water. Resting quietly on bed.   1215 Meets criteria for transfer to [facility].

## 2017-01-10 NOTE — CONSULTS
DATE OF SERVICE:  01/10/2017    REFERRING PHYSICIAN:  Dr. Kali Teixeira.    REASON FOR CONSULTATION:  Evaluation and antibiotic management of a   26-year-old female with osteomyelitis of the right foot.    HISTORY OF PRESENT ILLNESS:  The patient is a 26-year-old female with a   longstanding history of bunions.  Both her mother and her father both have   bunions.  It was recommended in her teen years that she would need surgery.    Patient underwent surgery on 10/13/2016.  She states that postoperatively she   noticed that the incision looked a little dark and was a little woozy, she   called the on-call physician over the weekend and was started on Keflex.    However, patient did develop a drug rash to Keflex and completed about 10 to   12 days of antibiotics.  Ultimately had all the sutures removed and the wound   continued to ooze, was placed on oral Bactrim, but ultimately required a   referral to the wound care center.  It took several weeks for the overlying   eschar to be removed and unfortunately the hardware was exposed.  The wound   was cultured in the wound care clinic and it did grow MRSA.  Patient did   receive another course of Bactrim, but ultimately was taken off all   antibiotics prior to her surgery.    PAST MEDICAL HISTORY AND PAST SURGICAL HISTORY:  Only wisdom teeth removal and   the bunion surgery.    SOCIAL HISTORY:  She is otherwise healthy, works as a  for   [employer].  She does not smoke, only social alcohol, which she has not done   in the last month.    FAMILY HISTORY:  Otherwise, noncontributory except for bunions.  Her father   had been treated for osteomyelitis with our services in the past.    MEDICATIONS:  She only was taking previous Bactrim, ibuprofen, multivitamins,   probiotics.    PHYSICAL EXAMINATION:  VITAL SIGNS:  She has a temperature of 36.6, blood pressure is 109/63, heart   rate of 55, respiratory rate 18.  HEENT:  Extraocular movements are intact.   Pupils equal, round, reactive to   light.  Oropharynx is clear.  NECK:  Supple.  HEART:  Regular rate and rhythm.  LUNGS:  Clear to auscultation and percussion.  ABDOMEN:  Soft, nontender, nondistended.  EXTREMITIES:  She has a wound VAC in place over the lateral aspect of her   foot.  There is some blood in the tubing.  The foot is warm to touch.  She has   no current rash.  She is not having any difficulties with the vancomycin   currently.    LABORATORY DATA:  Her white count from 01/05 is 5.1, hemoglobin of 12.8,   hematocrit of 37.6, platelets of 225.  Her sedimentation rate was 35.  Sodium   is 138, potassium of 3.6, chloride of 107, CO2 of 24, BUN of 11, creatinine   0.71, glucose of 112, C-reactive protein was 6.88.  Cultures from 12/30 showed   MRSA in the wound, sensitive to vancomycin with an RENETTA of 2, sensitive to   trimethoprim sulfamethoxazole, doxycycline.    IMPRESSION:  1.  Osteomyelitis of the right foot with exposed hardware status post removal   of hardware.  2.  History of bunions.    DISCUSSION:  At this point, I am going to change her vancomycin to daptomycin,   RENETTA to vancomycin was 2.  Hopefully, the daptomycin can be approved by her   insurance company for a 6-week course of therapy.  She will need a PICC line   and authorization from her insurance company.    Thank you for allowing us to assist in the management of this patient.       ____________________________________     MD TITO VALLE / PHILIP    DD:  01/10/2017 14:46:36  DT:  01/10/2017 15:09:20    D#:  [id number]  Job#:  [id number]    cc: ELANA ALVAREZ, Kali Teixeira MD

## 2017-01-10 NOTE — OP REPORT
DATE OF SERVICE:  01/10/2017    SURGEON:  Kali Teixeira MD    ANESTHESIOLOGIST:  Jeffrey Combs MD    PREOPERATIVE DIAGNOSES:  1.  Osteomyelitis, right first metatarsal osteotomy (bunion procedure).  2.  Retained hardware, fifth metatarsal.    POSTOPERATIVE DIAGNOSES:  1.  Osteomyelitis, right first metatarsal osteotomy (bunion procedure).  2.  Retained hardware, fifth metatarsal.    OPERATIVE PROCEDURE:  1.  Removal of deep hardware with debridement and irrigation of osteomyelitis   of first metatarsal.  2.  Removal of deep hardware of fifth metatarsal.  3.  Application of negative pressure wound VAC.    SPECIMEN:  Deep cultures were obtained from the deep first metatarsal side.    DESCRIPTION OF PROCEDURE:  The patient was seen in the preop holding area   where was reevaluated, skin scribed and her H and P was updated.  Questions   were answered and she was brought to the operating suite where general   anesthesia was initiated.  She did receive a sciatic level block for   postoperative pain management.  Antibiotics were held prior to the culture   being obtained and then vancomycin was dosed per weight.  She had been off   home antibiotics for 7 days prior to surgery.    The foot was exsanguinated with an Esmarch wrapped 3 turns at the ankles and   operative tourniquet, and I approached the fifth metatarsal initially.  Prior   incision was opened and the 2 screws were easily located and removed.  The   osteotomy was solid and there was no evidence of infection.  The wound was   primarily closed with nylon.    I then addressed the open wound at the first metatarsal with freshening of the   skin edges followed by removal of all deep hardware.  There was a film like   encasement of the plate and this was entirely removed.  The dorsal and lateral   aspect of the osteotomy closed, but there was a defect where the plate had   been positioned.  There was no motion at the osteotomy on testing and at least   partial  bridging is confirmed.  I debrided the bone and deep soft tissues.    Bleeding bone was encountered and no purulence was encountered.  Deep cultures   were obtained from the deep tissue mentioned.  The patient does have a recent   skin swab, which is by report positive for MRSA ([facility] [place] [facility]).    I lavaged the osteotomy site and then closed the proximal and distal extent   leaving the central portion open.  A wound VAC was then applied with a deep   sponge and an excellent seal.  A light dressing was then applied to the fifth   metatarsal and the patient was taken to recovery where she is noted to be   stable.    Based upon the recently noted to be positive MRSA culture and the operative   findings of osteomyelitis, she has been admitted inpatient for infectious   disease consultation and antibiotics per their discretion.  I performed a   single dose of the vancomycin and will turn follow up dosing to infectious   disease.  I will continue to follow the patient while hospitalized.       ____________________________________     MD FRANK CampoH / NTS    DD:  01/10/2017 12:13:24  DT:  01/10/2017 15:02:33    D#:  [id number]  Job#:  [id number]

## 2017-01-10 NOTE — CONSULTS
Infectious Diseases consult    Pt seen and examined.   Previous surgery Oct 2016.  Non healing wound with exposure of hardware.  Cx: MRSA.  Pt with red man's reaction to first dose of Vanco.  Vanco RENETTA 2.  Will change to Daptomycin 500 mg IV q 24 x 6 weeks from removal of HW.  PICC line ordered.  Will see authorization for outpatient IV therapy.    Discussed with pt, pt's Mom and my office.

## 2017-01-10 NOTE — IP AVS SNAPSHOT
" <p align=\"LEFT\"><IMG SRC=\"//[email or web address]\" alt=\"Image\" WIDTH=\"50%\" HEIGHT=\"200\" BORDER=\"\"></p>                   Name:Merna Ríos  Medical Record Number:[id number]  CSN: [id number]    YOB: 1990   Age: 26 y.o.  Sex: female  HT:1.753 m (5' 9\") WT: 69.3 kg (152 lb 12.5 oz)          Admit Date: 1/10/2017     Discharge Date:   Today's Date: 1/11/2017  Attending Doctor:  JOHN Henriquez                  Allergies:  Cephalexin and Bactrim ds          Your appointments     Jan 13, 2017  3:30 PM   Wound New Evaluation with Candace Win R.N.   [facility] ([address] [place] [address])    [address]  [place] [id number] [phone]            Jan 16, 2017 11:00 AM   Wound 30 Minute Procedure with Gilda Sales R.N.   [facility] ([address] [place] [address])    [address]  [place] [address]   [phone]            Jan 18, 2017 11:00 AM   Wound 30 Minute Procedure with Gilda Sales R.N.   [facility] ([address] [place] [address])    [address]  [place] [address]   [phone]            Jan 20, 2017 11:00 AM   Wound 30 Minute Procedure with Gilda Sales R.N.   [facility] ([address] [place] [address])    [address]  [place] [address]   [phone]              Follow-up Information     1. Schedule an appointment as soon as possible for a visit with Kali Teixeira M.D..    Specialty:  Orthopaedics    Contact information    [address]  [address]  [place] [address] [phone]           Medication List      Ask your Physician about these medications        Instructions    acetaminophen 325 MG Tabs   Commonly known as:  TYLENOL    Take 650 mg by mouth every four hours as needed.   Dose:  650 mg       ALEVE PM PO    Take  by mouth every day.       Amphetamine Sulfate 5 MG Tabs    Take 2 Tabs by mouth every day.   Dose:  2 Tab       Levonorgest-Eth Estrad 91-Day 0.1-0.02 & 0.01 MG Tabs    Take  by mouth every day.       * MULTIVITAMIN ADULT PO   " Take  by mouth every day.       * EMERGEN-C PINK PO    Take  by mouth as needed.       Vitamin C 1000 MG Tabs    Take  by mouth every day.       * Notice:  This list has 2 medication(s) that are the same as other medications prescribed for you. Read the directions carefully, and ask your doctor or other care provider to review them with you.

## 2017-01-11 ENCOUNTER — APPOINTMENT (OUTPATIENT)
Dept: WOUND CARE | Facility: MEDICAL CENTER | Age: 27
End: 2017-01-11
Attending: ORTHOPAEDIC SURGERY
Payer: COMMERCIAL

## 2017-01-11 ENCOUNTER — APPOINTMENT (OUTPATIENT)
Dept: RADIOLOGY | Facility: MEDICAL CENTER | Age: 27
DRG: 908 | End: 2017-01-11
Attending: INTERNAL MEDICINE
Payer: COMMERCIAL

## 2017-01-11 VITALS
OXYGEN SATURATION: 98 % | RESPIRATION RATE: 18 BRPM | SYSTOLIC BLOOD PRESSURE: 104 MMHG | HEIGHT: 69 IN | TEMPERATURE: 98.3 F | WEIGHT: 152.78 LBS | BODY MASS INDEX: 22.63 KG/M2 | DIASTOLIC BLOOD PRESSURE: 50 MMHG | HEART RATE: 50 BPM

## 2017-01-11 LAB
CK SERPL-CCNC: 23 U/L (ref 0–154)
GRAM STN SPEC: NORMAL
SIGNIFICANT IND 70042: NORMAL
SITE SITE: NORMAL
SOURCE SOURCE: NORMAL
VANCOMYCIN TROUGH SERPL-MCNC: <3.5 UG/ML (ref 10–20)

## 2017-01-11 PROCEDURE — 306372 DRESSING,VAC SIMPLACE MED: Performed by: ORTHOPAEDIC SURGERY

## 2017-01-11 PROCEDURE — 36569 INSJ PICC 5 YR+ W/O IMAGING: CPT

## 2017-01-11 PROCEDURE — 700111 HCHG RX REV CODE 636 W/ 250 OVERRIDE (IP): Performed by: ORTHOPAEDIC SURGERY

## 2017-01-11 PROCEDURE — 82550 ASSAY OF CK (CPK): CPT

## 2017-01-11 PROCEDURE — 36415 COLL VENOUS BLD VENIPUNCTURE: CPT

## 2017-01-11 PROCEDURE — 80202 ASSAY OF VANCOMYCIN: CPT

## 2017-01-11 PROCEDURE — 97605 NEG PRS WND THER DME<=50SQCM: CPT

## 2017-01-11 PROCEDURE — A9270 NON-COVERED ITEM OR SERVICE: HCPCS | Performed by: ORTHOPAEDIC SURGERY

## 2017-01-11 PROCEDURE — 700105 HCHG RX REV CODE 258: Performed by: INTERNAL MEDICINE

## 2017-01-11 PROCEDURE — 76937 US GUIDE VASCULAR ACCESS: CPT

## 2017-01-11 PROCEDURE — 700102 HCHG RX REV CODE 250 W/ 637 OVERRIDE(OP): Performed by: ORTHOPAEDIC SURGERY

## 2017-01-11 PROCEDURE — 700111 HCHG RX REV CODE 636 W/ 250 OVERRIDE (IP): Performed by: INTERNAL MEDICINE

## 2017-01-11 RX ORDER — ASPIRIN 325 MG
325 TABLET ORAL DAILY
Status: DISCONTINUED | OUTPATIENT
Start: 2017-01-11 | End: 2017-01-11 | Stop reason: HOSPADM

## 2017-01-11 RX ADMIN — ASPIRIN 325 MG ORAL TABLET 325 MG: 325 PILL ORAL at 15:04

## 2017-01-11 RX ADMIN — ACETAMINOPHEN 1000 MG: 500 TABLET ORAL at 15:04

## 2017-01-11 RX ADMIN — SODIUM CHLORIDE, POTASSIUM CHLORIDE, SODIUM LACTATE AND CALCIUM CHLORIDE 1000 ML: 600; 310; 30; 20 INJECTION, SOLUTION INTRAVENOUS at 06:16

## 2017-01-11 RX ADMIN — ACETAMINOPHEN 1000 MG: 500 TABLET ORAL at 05:50

## 2017-01-11 RX ADMIN — DAPTOMYCIN 500 MG: 500 INJECTION, POWDER, LYOPHILIZED, FOR SOLUTION INTRAVENOUS at 15:04

## 2017-01-11 ASSESSMENT — PAIN SCALES - GENERAL
PAINLEVEL_OUTOF10: 0

## 2017-01-11 NOTE — PROGRESS NOTES
Called Dr. Teixeira to update that Dr. Lemons's office has set up outpt infusions beginning tomorrow. Received telephone with read back d/c order.

## 2017-01-11 NOTE — DISCHARGE PLANNING
Patient discussed with floor RN this morning.  Patient reportedly lives with her parents and the discharge plan is for her to return home when medically able.  No current SS needs noted at this time.

## 2017-01-11 NOTE — PROGRESS NOTES
Received report, assumed pt care. Discussed POC. Encouraged CDB and leg exercises while in bed. Pt taught to inform nurse if experiencing pain above comfort goal, SOB, chest pain or for any further assistance. Assessment done. VSS. Will cont to monitor.

## 2017-01-11 NOTE — WOUND TEAM
Received TC from staff RN Heri who reports Dr. Lemons is insisting on NPWT dressing change today prior to discharge.  She has concerns about dressing in place until Friday appointment despite this is only a 3 days interval with dressing change.  Will make arrangements for this to happen today.

## 2017-01-11 NOTE — CARE PLAN
Problem: Safety  Goal: Will remain free from injury  Intervention: Provide assistance with mobility  Patient educated on fall risks and importance of using call light. Fall precautions in place: treaded slipper socks, bed is in low position, personal belongings, wastebasket, call light, and phone are within patient's reach. Bed alarm on, hourly rounding in place. Mobility assessed, standby assist with crutches.          Problem: Infection  Goal: Will remain free from infection  Intervention: Assess signs and symptoms of infection  Labs and vitals assessed for infection. Education reinforced for infection prevention. Patient verbalizes understanding. Will cont with care.

## 2017-01-11 NOTE — WOUND TEAM
"[facility]  Inpatient Services  Initial Wound & Skin Care Evaluation    Admission Date:  1/10/2017   HPI, PMH, SH: Reviewed  Unit where seen by Wound Team: 2207/00    WOUND CONSULT RELATED TO: Negative Pressure Wound Therapy change    SUBJECTIVE:  \"I'm re-reading it with friends, it gives us a way to keep talking to each other.\"     Self Report / Pain Level: no c/o pain      OBJECTIVE:  On pressure redistribution mattress, R foot elevated  WOUND TYPE, LOCATION, CHARACTERISTICS (Pressure ulcers: location, stage, POA or date identified)  Open Surgical wound Right Medial foot Foot  Periwound Skin: Intact  Drainage : scant sanguinous       Tissue Type and %:    100% red  Wound Edges:    open    Odor:     None   Exposed structure(s):   Bone and sutures   Signs and Symptoms of Infection: none      Measurements (cm):     Length:    2.3  Width:     0.5  Depth:    0.7  Tracts/undermining:   None     INTERVENTIONS BY WOUND TEAM: removed drsg, cleaned wounds with wound cleanser. Picture and measurement taken. Mepitel one over sutures on both ends of wounds. Black foam into wound bed, across incision part sealed and black foam \"button\" placed to pad underneath trac pad. Sealed and NPWT resumed 125 mmHg continuous.   Dressing Options: Dry Gauze, Elastic Wrap    Interdisciplinary consultation:   With Nursing;With Patient    EVALUATION: pt has clean appearing wound bed. Some bleeding present with removal of foam. On lateral surface of wound there is incision with sutures.     Factors affecting wound healing: infection requiring removal of hardwear  Goals: decreased wound size 2% each week      NURSING PLAN OF CARE ORDERS (X):    Dressing changes: See Dressing Maintenance orders: x  Skin care: See Skin Care orders:   Rectal tube care: See Rectal Tube Care orders:   Other orders:    RSKIN: CURRENT (X) ORDERED (O)  Pt performs self care  Q shift Moris:  X  Q shift pressure point assessments:  X  Pressure " redistribution mattress    x    YOSELYN      Bariatric YOSELYN      Bariatric foam        Heel float boots       Heels floated on pillows      Barrier wipes      Barrier Cream      Barrier paste      Sacral silicone dressing      Padded O2 tubing      Anchorfast      Trach with Optifoam split foam       Waffle cushion      Rectal tube or BMS      Antifungal tx    Turn q 2 hours pt performs  Up to chair  Ambulate   PT/OT     Dietician      PO  x   TF   TPN     PVN    NPO   # days   Other       WOUND TEAM PLAN OF CARE (X):   NPWT change 3 x week:   x     Dressing changes by wound team:    x   Follow up as needed:       Other (explain):    Anticipated discharge plans (X):  SNF:           Home Care:           Outpatient Wound Center:   x         Self Care:            Other:

## 2017-01-11 NOTE — DISCHARGE INSTRUCTIONS
Discharge Instructions    Discharged to home by car with relative. Discharged via wheelchair, hospital escort: Yes.  Special equipment needed: Wound VAC    Be sure to schedule a follow-up appointment with your primary care doctor or any specialists as instructed.     Discharge Plan:   Influenza Vaccine Indication: Patient Refuses    I understand that a diet low in cholesterol, fat, and sodium is recommended for good health. Unless I have been given specific instructions below for another diet, I accept this instruction as my diet prescription.   Other diet: n/a    Special Instructions: None    · Is patient discharged on Warfarin / Coumadin?   No     · Is patient Post Blood Transfusion?  No    Depression / Suicide Risk    As you are discharged from this [facility] facility, it is important to learn how to keep safe from harming yourself.    Recognize the warning signs:  · Abrupt changes in personality, positive or negative- including increase in energy   · Giving away possessions  · Change in eating patterns- significant weight changes-  positive or negative  · Change in sleeping patterns- unable to sleep or sleeping all the time   · Unwillingness or inability to communicate  · Depression  · Unusual sadness, discouragement and loneliness  · Talk of wanting to die  · Neglect of personal appearance   · Rebelliousness- reckless behavior  · Withdrawal from people/activities they love  · Confusion- inability to concentrate     If you or a loved one observes any of these behaviors or has concerns about self-harm, here's what you can do:  · Talk about it- your feelings and reasons for harming yourself  · Remove any means that you might use to hurt yourself (examples: pills, rope, extension cords, firearm)  · Get professional help from the community (Mental Health, Substance Abuse, psychological counseling)  · Do not be alone:Call your Safe Contact- someone whom you trust who will be there for you.  · Call your local  "CRISIS HOTLINE 372-0571 or 991-078-7385  · Call your local Children's Mobile Crisis Response Team [place] (650) 650-6304 or www.Carnad  · Call the toll free National Suicide Prevention Hotlines   · National Suicide Prevention Lifeline 475-852-XCUF (8040)  · National [place] Line Network 800-SUICIDE (975-1828)    PICC Home Guide  A peripherally inserted central catheter (PICC) is a long, thin, flexible tube that is inserted into a vein in the upper arm. It is a form of intravenous (IV) access. It is considered to be a \"central\" line because the tip of the PICC ends in a large vein in your chest. This large vein is called the superior vena cava (SVC). The PICC tip ends in the SVC because there is a lot of blood flow in the SVC. This allows medicines and IV fluids to be quickly distributed throughout the body. The PICC is inserted using a sterile technique by a specially trained nurse or physician. After the PICC is inserted, a chest X-ray exam is done to be sure it is in the correct place.   A PICC may be placed for different reasons, such as:  · To give medicines and liquid nutrition that can only be given through a central line. Examples are:  ¨ Certain antibiotic treatments.  ¨ Chemotherapy.  ¨ Total parenteral nutrition (TPN).  · To take frequent blood samples.  · To give IV fluids and blood products.  · If there is difficulty placing a peripheral intravenous (PIV) catheter.  If taken care of properly, a PICC can remain in place for several months. A PICC can also allow a person to go home from the hospital early. Medicine and PICC care can be managed at home by a family member or home health care team.  WHAT PROBLEMS CAN HAPPEN WHEN I HAVE A PICC?  Problems with a PICC can occasionally occur. These may include the following:  · A blood clot (thrombus) forming in or at the tip of the PICC. This can cause the PICC to become clogged. A clot-dissolving medicine called tissue plasminogen activator (tPA) " "can be given through the PICC to help break up the clot.  · Inflammation of the vein (phlebitis) in which the PICC is placed. Signs of inflammation may include redness, pain at the insertion site, red streaks, or being able to feel a \"cord\" in the vein where the PICC is located.  · Infection in the PICC or at the insertion site. Signs of infection may include fever, chills, redness, swelling, or pus drainage from the PICC insertion site.  · PICC movement (malposition). The PICC tip may move from its original position due to excessive physical activity, forceful coughing, sneezing, or vomiting.  · A break or cut in the PICC. It is important to not use scissors near the PICC.  · Nerve or tendon irritation or injury during PICC insertion.  WHAT SHOULD I KEEP IN MIND ABOUT ACTIVITIES WHEN I HAVE A PICC?  · You may bend your arm and move it freely. If your PICC is near or at the bend of your elbow, avoid activity with repeated motion at the elbow.  · Rest at home for the remainder of the day following PICC line insertion.  · Avoid lifting heavy objects as instructed by your health care provider.  · Avoid using a crutch with the arm on the same side as your PICC. You may need to use a walker.  WHAT SHOULD I KNOW ABOUT MY PICC DRESSING?  · Keep your PICC bandage (dressing) clean and dry to prevent infection.  ¨ Ask your health care provider when you may shower. Ask your health care provider to teach you how to wrap the PICC when you do take a shower.  · Change the PICC dressing as instructed by your health care provider.  · Change your PICC dressing if it becomes loose or wet.  WHAT SHOULD I KNOW ABOUT PICC CARE?  · Check the PICC insertion site daily for leakage, redness, swelling, or pain.  · Do not take a bath, swim, or use hot tubs when you have a PICC. Cover PICC line with clear plastic wrap and tape to keep it dry while showering.  · Flush the PICC as directed by your health care provider. Let your health care " "provider know right away if the PICC is difficult to flush or does not flush. Do not use force to flush the PICC.  · Do not use a syringe that is less than 10 mL to flush the PICC.  · Never pull or tug on the PICC.  · Avoid blood pressure checks on the arm with the PICC.  · Keep your PICC identification card with you at all times.  · Do not take the PICC out yourself. Only a trained clinical professional should remove the PICC.  SEEK IMMEDIATE MEDICAL CARE IF:  · Your PICC is accidentally pulled all the way out. If this happens, cover the insertion site with a bandage or gauze dressing. Do not throw the PICC away. Your health care provider will need to inspect it.  · Your PICC was tugged or pulled and has partially come out. Do not  push the PICC back in.  · There is any type of drainage, redness, or swelling where the PICC enters the skin.  · You cannot flush the PICC, it is difficult to flush, or the PICC leaks around the insertion site when it is flushed.  · You hear a \"flushing\" sound when the PICC is flushed.  · You have pain, discomfort, or numbness in your arm, shoulder, or jaw on the same side as the PICC.  · You feel your heart \"racing\" or skipping beats.  · You notice a hole or tear in the PICC.  · You develop chills or a fever.  MAKE SURE YOU:   · Understand these instructions.  · Will watch your condition.  · Will get help right away if you are not doing well or get worse.     This information is not intended to replace advice given to you by your health care provider. Make sure you discuss any questions you have with your health care provider.     Document Released: 06/23/2004 Document Revised: 01/08/2016 Document Reviewed: 08/25/2014  Elsevier Interactive Patient Education ©2016 [employer]    "

## 2017-01-11 NOTE — PROGRESS NOTES
Bedside report received. POC explained. Patient verbalizes understanding. Vital signs stable. Unlabored breathing. Denies pain. Patient parent's at bedside. Will cont with care.

## 2017-01-12 ENCOUNTER — APPOINTMENT (OUTPATIENT)
Dept: WOUND CARE | Facility: MEDICAL CENTER | Age: 27
End: 2017-01-12
Attending: ORTHOPAEDIC SURGERY
Payer: COMMERCIAL

## 2017-01-12 NOTE — PROGRESS NOTES
MD in to see pt; d/c orders received. IV dc'd. Pt changed into clothing with assistance. Discharge instructions given; pt and family verbalized understanding and questions answered. Prescriptions with pt. Pt dc'd in w/c with hospital escort at 1725.

## 2017-01-13 ENCOUNTER — OFFICE VISIT (OUTPATIENT)
Dept: WOUND CARE | Facility: MEDICAL CENTER | Age: 27
End: 2017-01-13
Attending: ORTHOPAEDIC SURGERY
Payer: COMMERCIAL

## 2017-01-13 LAB
BACTERIA SPEC ANAEROBE CULT: NORMAL
BACTERIA TISS AEROBE CULT: ABNORMAL
BACTERIA TISS AEROBE CULT: ABNORMAL
GRAM STN SPEC: ABNORMAL
SIGNIFICANT IND 70042: ABNORMAL
SIGNIFICANT IND 70042: NORMAL
SITE SITE: ABNORMAL
SITE SITE: NORMAL
SOURCE SOURCE: ABNORMAL
SOURCE SOURCE: NORMAL

## 2017-01-13 PROCEDURE — A6457 TUBULAR DRESSING: HCPCS

## 2017-01-13 PROCEDURE — A6402 STERILE GAUZE <= 16 SQ IN: HCPCS

## 2017-01-13 PROCEDURE — 97605 NEG PRS WND THER DME<=50SQCM: CPT

## 2017-01-13 NOTE — CERTIFICATION
[facility]  [facility]  [address]  [address]  [place] [address]  [phone] Fax: [phone]      Initial Evaluation  For Certification Period: 01/13/2017 - 02/13/2017  Start of Care: 01/13/2017      Referring Physician: WM Cosme Teixeira MD  Primary Physician:  Arianne English  Consulting Physicians:         Wound(s): Right Foot  Pharmacy of Choice:        Subjective:        HPI: 26 year old female, Patient had R. Foot bunion removed on 10/13/16, has had delayed wound healing, patient then went in with Dr. Teixeira again 01/10/2017 to have hardware removed in the right foot.      Pain:            Past Medical History:  Past Medical History   Diagnosis Date   • Hemorrhagic disorder (CMS-HCC)    • Infectious disease 1/2/17   • Cold 1/2/17     Had the flu   • Staph aureus infection 11/2016     Right foot       Current Medications:  Current outpatient prescriptions:   •  Ascorbic Acid (VITAMIN C) 1000 MG Tab, Take  by mouth every day., Disp: , Rfl:   •  Multiple Vitamins-Minerals (MULTIVITAMIN ADULT PO), Take  by mouth every day., Disp: , Rfl:   •  Levonorgest-Eth Estrad 91-Day 0.1-0.02 & 0.01 MG Tab, Take  by mouth every day., Disp: , Rfl:   •  Amphetamine Sulfate 5 MG Tab, Take 2 Tabs by mouth every day., Disp: , Rfl:   •  Naproxen Sod-Diphenhydramine (ALEVE PM PO), Take  by mouth every day., Disp: , Rfl:   •  acetaminophen (TYLENOL) 325 MG Tab, Take 650 mg by mouth every four hours as needed., Disp: , Rfl:   •  Multiple Vitamins-Minerals (EMERGEN-C PINK PO), Take  by mouth as needed., Disp: , Rfl:     Allergies:  Allergies   Allergen Reactions   • Cephalexin [Keflex] Hives   • Bactrim Ds [Sulfamethoxazole W-Trimethoprim] Hives        Past Surgical History:   Past Surgical History   Procedure Laterality Date   • Bunionectomy Right 10/13/2016     Right foot   • Hardware removal ortho Right 1/10/2017     Procedure: HARDWARE REMOVAL ORTHO - PLATE/SCREWS 1ST AND 5TH METATARSALS ;   Surgeon: Kali Teixeira M.D.;  Location: SURGERY [facility];  Service:        Social History:    Social History     Social History Narrative           Objective:      Tests and Measures: DP and PT pulses strong palpable, Hair growth to RLE present, foot warm and cap refill WNL  12/30/16 c and s taken  Orthotic, protective, supportive devices: No longer in surgical boot per MD, wearing Hookas currently    Fall Risk Assessment (rachel all that apply with an X): Completed 01/13/2017   65 years or older     Fall within the last 2 years, uses   Ambulatory devices  Loss of protective sensation in feet,   Use of prostethic/orthotic, years    Presence of lower extremity/foot/toe amputation   xTaking medication that increases risk (per facility policy)    Interventions Recommended (if any of the above are selected):   Use of Assistive Device:________________________   Supervision with ambulation:  Caregiver   Assistance with ambulation:  Caregiver   xHome safety education:  Educational material provided         Wound Characteristics                                                    Location:  Right Foot Medial Initial Evaluation  Date: 01/13/2017   Tissue Type and %: 100% moist pink   Periwound: intact   Drainage: Minimal serosanguinous   Exposed structures none   Wound Edges:   Open, attached   Odor: none   S&S of Infection:   none   Edema: none   Sensation: intact               Measurements:  Right Foot Medial Initial Evaluation  Date: 01/13/2017   Length (cm) 0.6   Width (cm) 2.1   Depth (cm) 1.0   Area (cm2) 1.26cm2   Tract/undermine UM 7-11 0.3cm        Procedures:     Debridement :  Non-selective blunt debridement with NS moisten gauze   Cleansed with: Normal saline and gauze                                                                        Periwound protected with: No sting skin prep   Primary dressing: Mepitel on stitches, 1 black foam in wound bed and 1 black foam for tract pad, total 2 black  foams.   Secondary Dressing: Resumed NPWT at 125mmHg continuously   Other: Tubigrib size E     Patient Education: Instructed patient about how wound vac works, may not have any drainage in tube or cannister & it will still be working.  Explained the ultimate goal of NPWT - granulation.  Explained how to change the cannister if it does become full.  Explained that if cannister fills with blood - go to ER.  Explained how to take sponge out and replace with Normal saline moistened gauze, cover with dry gauze and tape - provided patient with a dressings in case problems occur and wound vac is not working properly for > 2 hours.Educated patient to look at suction and determine leaks around wound vac, showed patient how to patch a leak and provided patient with drape pieces to patch leaks.      Professional Collaboration: Initial Evaluation sent to  Cosme Teixeira MD via [email or web address].      Assessment:      Wound etiology: surgical    Wound Progress:  Initial Evaluation    Rationale for Treatment: NPWT for granulation and closure    Patient tolerance/compliance: Patient tolerated treatment.    Complicating factors: Surgical    Need for ongoing Advanced Wound Care services: Patient requires skilled therapeutic wound care services for product selection, application of product, debridement, close monitoring with clinical assessment for expedite of wound healing.       Plan:      Treatment Plan and Recommendations:  Selective debridement  NPWT    Frequency: 3x/weekly      Treatment Goals: STG 2 Weeks  LTG 4 Weeks   Granulation Tissue: 100% Resolved   Decrease Necrotic Tissue to: 0% 0%   Wound Phase:  proliferation maturation   Decrease Size by: 50% 100%   Periwound:  intact intact   Decrease tracts/undermining by: 100% NA   Decrease Pain:  0/10 0/10       At the time of each visit a thorough assessment of the patient is completed to assure the  appropriateness of our plan of care.  The dressings or modalities may need to be adapted    from the original plan to address any significant changes in the wound environment.          Clinician Signature:_______________________________Date__________________      Physician Signature:______________________________Date:__________________

## 2017-01-16 ENCOUNTER — OFFICE VISIT (OUTPATIENT)
Dept: WOUND CARE | Facility: MEDICAL CENTER | Age: 27
End: 2017-01-16
Attending: ORTHOPAEDIC SURGERY
Payer: COMMERCIAL

## 2017-01-16 PROCEDURE — A6402 STERILE GAUZE <= 16 SQ IN: HCPCS

## 2017-01-16 PROCEDURE — 97605 NEG PRS WND THER DME<=50SQCM: CPT

## 2017-01-16 NOTE — WOUND TEAM
Advanced Wound Care  [facility]  [address]  [address]  [place] [address]  [phone] Fax: [phone]      Encounter note  For Certification Period: 01/13/2017 - 02/13/2017  Start of Care: 01/13/2017      Referring Physician: WM Cosme Teixeira MD  Primary Physician:  Arianne English  Consulting Physicians:         Wound(s): Right Foot  Pharmacy of Choice:        Subjective:        HPI: 26 year old female, Patient had R. Foot bunion removed on 10/13/16, has had delayed wound healing, patient then went in with Dr. Teixeira again 01/10/2017 to have hardware removed in the right foot.      Pain:            Current Medications: no changes to current medications    Allergies:  Allergies   Allergen Reactions   • Cephalexin [Keflex] Hives   • Bactrim Ds [Sulfamethoxazole W-Trimethoprim] Hives        Past Surgical History:   Past Surgical History   Procedure Laterality Date   • Bunionectomy Right 10/13/2016     Right foot   • Hardware removal ortho Right 1/10/2017     Procedure: HARDWARE REMOVAL ORTHO - PLATE/SCREWS 1ST AND 5TH METATARSALS ;  Surgeon: Kali Teixeira M.D.;  Location: SURGERY [facility];  Service:                  Objective:      Tests and Measures: DP and PT pulses strong palpable, Hair growth to RLE present, foot warm and cap refill WNL  12/30/16 c and s taken  Orthotic, protective, supportive devices: No longer in surgical boot per MD, wearing Hookas currently    Fall Risk Assessment (rachel all that apply with an X): Completed 01/13/2017   65 years or older     Fall within the last 2 years, uses   Ambulatory devices  Loss of protective sensation in feet,   Use of prostethic/orthotic, years    Presence of lower extremity/foot/toe amputation   xTaking medication that increases risk (per facility policy)    Interventions Recommended (if any of the above are selected):   Use of Assistive Device:________________________   Supervision with ambulation:   Caregiver   Assistance with ambulation:  Caregiver   xHome safety education:  Educational material provided         Wound Characteristics                                                    Location:  Right Foot Medial Initial Evaluation  Date: 01/13/2017 Encounter note  Date: 01/16/2017   Tissue Type and %: 100% moist pink 90% pink/red tissue, 5% visible bone, 5% black in depth of wound bed, possible dried blood clot, or sutures   Periwound: intact intact   Drainage: Minimal serosanguinous Scant ss   Exposed structures None Sutures possibly deep in wound bed   Wound Edges:   Open, attached open   Odor: None none   S&S of Infection:   None None, previous positive wound culture, pt on daily IV abx currently through [facility]   Edema: None localized   Sensation: intact intact               Measurements:  Right Foot Medial Initial Evaluation  Date: 01/13/2017 Encounter note  Date: 01/16/2017   Length (cm) 0.6 0.7   Width (cm) 2.1 1.9   Depth (cm) 1.0 1.0   Area (cm2) 1.26cm2 1.33 cm2   Tract/undermine UM 7-11 0.3cm none        Procedures:     Debridement :  Non-selective blunt debridement with NS moisten gauze and cotton tip applicator   Cleansed with: Normal saline and gauze                                                                        Periwound protected with: No sting skin prep, benzoin   Primary dressing: Mepitel on stitches, 1 black foam in wound bed and bridged to dorsum of foot   Secondary Dressing: Resumed NPWT at 125mmHg continuously   Other: Tubigrib size E, with hole cut to fit tubing     Patient Education: reinforced education regarding vac care and trouble shooting issues. Pt is competent with vac care as she had one previously before hardware was removed.     Professional Collaboration: none today      Assessment:      Wound etiology: surgical    Wound Progress:  Smaller per measurements, small piece of bone visible and palpable    Rationale for Treatment: NPWT for granulation, manage exudate, aid  in healing    Patient tolerance/compliance: Patient tolerated treatment.    Complicating factors: Surgical    Need for ongoing Advanced Wound Care services: Patient requires skilled therapeutic wound care services for product selection, application of product, debridement, close monitoring with clinical assessment for expedite of wound healing.       Plan:      Treatment Plan and Recommendations:  Selective debridement  NPWT    Frequency: 3x/weekly      Treatment Goals: STG 2 Weeks  LTG 4 Weeks   Granulation Tissue: 100% Resolved   Decrease Necrotic Tissue to: 0% 0%   Wound Phase:  proliferation maturation   Decrease Size by: 50% 100%   Periwound:  intact intact   Decrease tracts/undermining by: 100% NA   Decrease Pain:  0/10 0/10       At the time of each visit a thorough assessment of the patient is completed to assure the  appropriateness of our plan of care.  The dressings or modalities may need to be adapted   from the original plan to address any significant changes in the wound environment.          Clinician Signature:_______________________________Date__________________      Physician Signature:______________________________Date:__________________

## 2017-01-18 ENCOUNTER — OFFICE VISIT (OUTPATIENT)
Dept: WOUND CARE | Facility: MEDICAL CENTER | Age: 27
End: 2017-01-18
Attending: ORTHOPAEDIC SURGERY
Payer: COMMERCIAL

## 2017-01-18 PROCEDURE — 97605 NEG PRS WND THER DME<=50SQCM: CPT

## 2017-01-18 PROCEDURE — A6402 STERILE GAUZE <= 16 SQ IN: HCPCS

## 2017-01-18 NOTE — WOUND TEAM
Advanced Wound Care  [facility]  [address]  [address]  [place] [address]  [phone] Fax: [phone]      Encounter note  For Certification Period: 01/13/2017 - 02/13/2017  Start of Care: 01/13/2017      Referring Physician: WM Cosme Teixeira MD  Primary Physician:  Arianne English  Consulting Physicians:         Wound(s): Right Foot  Pharmacy of Choice:        Subjective:        HPI: 26 year old female, Patient had R. Foot bunion removed on 10/13/16, has had delayed wound healing, patient then went in with Dr. Teixeira again 01/10/2017 to have hardware removed in the right foot.      Pain: Patient denies pain today        Current Medications: no changes to current medications    Allergies:  Allergies   Allergen Reactions   • Cephalexin [Keflex] Hives   • Bactrim Ds [Sulfamethoxazole W-Trimethoprim] Hives        Past Surgical History:   Past Surgical History   Procedure Laterality Date   • Bunionectomy Right 10/13/2016     Right foot   • Hardware removal ortho Right 1/10/2017     Procedure: HARDWARE REMOVAL ORTHO - PLATE/SCREWS 1ST AND 5TH METATARSALS ;  Surgeon: Kali Teixeira M.D.;  Location: SURGERY [facility];  Service:            Objective:      Tests and Measures: DP and PT pulses strong palpable, Hair growth to RLE present, foot warm and cap refill WNL  12/30/16 c and s taken  Orthotic, protective, supportive devices: No longer in surgical boot per MD, wearing Hookas currently    Fall Risk Assessment (rachel all that apply with an X): Completed 01/13/2017   65 years or older     Fall within the last 2 years, uses   Ambulatory devices  Loss of protective sensation in feet,   Use of prostethic/orthotic, years    Presence of lower extremity/foot/toe amputation   xTaking medication that increases risk (per facility policy)    Interventions Recommended (if any of the above are selected):   Use of Assistive Device:________________________   Supervision with ambulation:   Caregiver   Assistance with ambulation:  Caregiver   xHome safety education:  Educational material provided         Wound Characteristics                                                    Location:  Right Foot Medial Initial Evaluation  Date: 01/13/2017 Encounter note  Date: 01/16/2017   Tissue Type and %: 100% moist pink 100% pink/red tissue   Periwound: intact Intact   Drainage: Minimal serosanguinous Scant ss   Exposed structures None None   Wound Edges:   Open, attached Open   Odor: None None   S&S of Infection:   None None, previous positive wound culture, pt on daily IV abx currently through [facility]   Edema: None Localized   Sensation: intact intact               Measurements:  Right Foot Medial Initial Evaluation  Date: 01/13/2017 Encounter note  Date: 01/16/2017   Length (cm) 0.6 0.7   Width (cm) 2.1 1.9   Depth (cm) 1.0 1.0   Area (cm2) 1.26cm2 1.33 cm2   Tract/undermine UM 7-11 0.3cm none        Procedures:     Debridement :  Non-selective blunt debridement with NS moisten gauze and cotton tip applicator   Cleansed with: Normal saline                                                                        Periwound protected with: No sting skin prep, benzoin   Primary dressing: Mepitel on stitches, 1 black foam in wound bed, 1 bridged to dorsum of foot and 1 for button (3 pieces total)   Secondary Dressing:    Other: NPWT restarted at 125 mmHg continuously with no leaks noted, Tubigrib size E, with hole cut to fit tubing     Patient Education: Reinforced education regarding vac care and trouble shooting issues. Pt is competent with vac care as she had one previously before hardware was removed. Discussed wound progression, POC and reinforced ss infection - erythema, edema, localized heat, fever/chills/N+V, when to call MD/go to ER. Pt verbalized understanding.      Professional Collaboration: none today      Assessment:      Wound etiology: surgical    Wound Progress:  Smaller per measurements, increased  viable tissue    Rationale for Treatment: NPWT for granulation, manage exudate, aid in healing    Patient tolerance/compliance: Patient tolerated treatment.    Complicating factors: Surgical    Need for ongoing Advanced Wound Care services: Patient requires skilled therapeutic wound care services for product selection, application of product, debridement, close monitoring with clinical assessment for expedite of wound healing.       Plan:      Treatment Plan and Recommendations:  Selective debridement  NPWT    Frequency: 3x/weekly      Treatment Goals: STG 2 Weeks  LTG 4 Weeks   Granulation Tissue: 100% Resolved   Decrease Necrotic Tissue to: 0% 0%   Wound Phase:  proliferation maturation   Decrease Size by: 50% 100%   Periwound:  intact intact   Decrease tracts/undermining by: 100% NA   Decrease Pain:  0/10 0/10       At the time of each visit a thorough assessment of the patient is completed to assure the  appropriateness of our plan of care.  The dressings or modalities may need to be adapted   from the original plan to address any significant changes in the wound environment.          Clinician Signature:_______________________________Date__________________      Physician Signature:______________________________Date:__________________

## 2017-01-20 ENCOUNTER — NON-PROVIDER VISIT (OUTPATIENT)
Dept: WOUND CARE | Facility: MEDICAL CENTER | Age: 27
End: 2017-01-20
Attending: ORTHOPAEDIC SURGERY
Payer: COMMERCIAL

## 2017-01-20 PROCEDURE — A6212 FOAM DRG <=16 SQ IN W/BORDER: HCPCS

## 2017-01-20 PROCEDURE — A6457 TUBULAR DRESSING: HCPCS

## 2017-01-20 PROCEDURE — 303972 HCHG HYPAFIX RET DRST 18SQ PC"

## 2017-01-20 PROCEDURE — A6402 STERILE GAUZE <= 16 SQ IN: HCPCS

## 2017-01-20 PROCEDURE — 97602 WOUND(S) CARE NON-SELECTIVE: CPT

## 2017-01-20 NOTE — WOUND TEAM
Advanced Wound Care  [facility]  [address]  [address]  [place] [address]  [phone] Fax: [phone]      Encounter note  For Certification Period: 01/13/2017 - 02/13/2017  Start of Care: 01/13/2017      Referring Physician: WM Cosme Teixeira MD  Primary Physician:  Arianne English  Consulting Physicians:         Wound(s): Right Foot  Pharmacy of Choice:        Subjective:        HPI: 26 year old female, Patient had R. Foot bunion removed on 10/13/16, has had delayed wound healing, patient then went in with Dr. Teixeira again 01/10/2017 to have hardware removed in the right foot.      Pain: Patient denies pain today        Current Medications: no changes to current medications    Allergies:  Allergies   Allergen Reactions   • Cephalexin [Keflex] Hives   • Bactrim Ds [Sulfamethoxazole W-Trimethoprim] Hives        Past Surgical History:   Past Surgical History   Procedure Laterality Date   • Bunionectomy Right 10/13/2016     Right foot   • Hardware removal ortho Right 1/10/2017     Procedure: HARDWARE REMOVAL ORTHO - PLATE/SCREWS 1ST AND 5TH METATARSALS ;  Surgeon: Kali Teixeira M.D.;  Location: SURGERY [facility];  Service:            Objective:      Tests and Measures: DP and PT pulses strong palpable, Hair growth to RLE present, foot warm and cap refill WNL  12/30/16 c and s taken  Orthotic, protective, supportive devices: No longer in surgical boot per MD, wearing Hookas currently    Fall Risk Assessment (rachel all that apply with an X): Completed 01/13/2017   65 years or older     Fall within the last 2 years, uses   Ambulatory devices  Loss of protective sensation in feet,   Use of prostethic/orthotic, years    Presence of lower extremity/foot/toe amputation   xTaking medication that increases risk (per facility policy)    Interventions Recommended (if any of the above are selected):   Use of Assistive Device:________________________   Supervision with ambulation:   Caregiver   Assistance with ambulation:  Caregiver   xHome safety education:  Educational material provided         Wound Characteristics                                                    Location:  Right Foot Medial Initial Evaluation  Date: 01/13/2017 Encounter note  Date: 01/20/2017   Tissue Type and %: 100% moist pink 100% pale pink tissue   Periwound: intact Intact   Drainage: Minimal serosanguinous Scant ss   Exposed structures None None   Wound Edges:   Open, attached Open   Odor: None None   S&S of Infection:   None None, however,  previous positive wound culture, pt on daily IV abx currently through [facility]   Edema: None Localized   Sensation: intact intact               Measurements:  Right Foot Medial Initial Evaluation  Date: 01/13/2017 Encounter note  Date: 01/16/2017   Length (cm) 0.6 0.7   Width (cm) 2.1 1.9   Depth (cm) 1.0 1.0   Area (cm2) 1.26cm2 1.33 cm2   Tract/undermine UM 7-11 0.3cm none        Procedures:  1/20/17: pt placed on vac hold today   Debridement :  Non-selective blunt debridement with NS moisten gauze and cotton tip applicator   Cleansed with: Normal saline                                                                        Periwound protected with: No sting skin prep, moisture barrier cream   Primary dressing: Mepitel on sutures, medial and lateral,  Aquacel packing strip into medial wound bed   Secondary Dressing: adhesive foam over wound and to lateral suture line which is approximated, to avoid rubbing on boot   Other: Tubigrib size E     Patient Education:  Wound depth is now ~ 0.5 cm, pt given vac break for the weekend and will reassess need for vac on Monday 1/23. Discussed POC, wound care rationale, dressing selection and to return to [facility] three times weekly for appts. Pt instructed to keep dressing CDI and to return to ER for any s/s infection, n/v, fever or chills. Pt verbalized understanding to all.     Professional Collaboration: none today      Assessment:       Wound etiology: surgical    Wound Progress:  Decreased depth    Rationale for Treatment: AqAg to manage bioburden, absorb exudate, and maintain moist wound environment without laterally wicking exudate therefore reducing mily-wound maceration    Patient tolerance/compliance: Patient tolerated treatment.    Complicating factors: Surgical    Need for ongoing Advanced Wound Care services: Patient requires skilled therapeutic wound care services for product selection, application of product, debridement, close monitoring with clinical assessment for expedite of wound healing.       Plan:      Treatment Plan and Recommendations:  Selective debridement  NPWT    Frequency: 3x/weekly      Treatment Goals: STG 2 Weeks  LTG 4 Weeks   Granulation Tissue: 100% Resolved   Decrease Necrotic Tissue to: 0% 0%   Wound Phase:  proliferation maturation   Decrease Size by: 50% 100%   Periwound:  intact intact   Decrease tracts/undermining by: 100% NA   Decrease Pain:  0/10 0/10       At the time of each visit a thorough assessment of the patient is completed to assure the  appropriateness of our plan of care.  The dressings or modalities may need to be adapted   from the original plan to address any significant changes in the wound environment.          Clinician Signature:_______________________________Date__________________      Physician Signature:______________________________Date:__________________

## 2017-01-23 ENCOUNTER — NON-PROVIDER VISIT (OUTPATIENT)
Dept: WOUND CARE | Facility: MEDICAL CENTER | Age: 27
End: 2017-01-23
Attending: ORTHOPAEDIC SURGERY
Payer: COMMERCIAL

## 2017-01-23 PROCEDURE — A6212 FOAM DRG <=16 SQ IN W/BORDER: HCPCS

## 2017-01-23 PROCEDURE — 303972 HCHG HYPAFIX RET DRST 18SQ PC"

## 2017-01-23 PROCEDURE — 97597 DBRDMT OPN WND 1ST 20 CM/<: CPT

## 2017-01-24 NOTE — WOUND TEAM
Advanced Wound Care  [facility]  [address]  [address]  [place] [address]  [phone] Fax: [phone]      Encounter note  For Certification Period: 01/13/2017 - 02/13/2017  Start of Care: 01/13/2017      Referring Physician: WM Cosme Teixeira MD  Primary Physician:  Arianne English  Consulting Physicians:         Wound(s): Right Foot  Pharmacy of Choice:        Subjective:        HPI: 26 year old female, Patient had R. Foot bunion removed on 10/13/16, has had delayed wound healing, patient then went in with Dr. Teixeira again 01/10/2017 to have hardware removed in the right foot.      Pain: Patient denies pain today        Current Medications: no changes to current medications    Allergies:  Allergies   Allergen Reactions   • Cephalexin [Keflex] Hives   • Bactrim Ds [Sulfamethoxazole W-Trimethoprim] Hives        Past Surgical History:   Past Surgical History   Procedure Laterality Date   • Bunionectomy Right 10/13/2016     Right foot   • Hardware removal ortho Right 1/10/2017     Procedure: HARDWARE REMOVAL ORTHO - PLATE/SCREWS 1ST AND 5TH METATARSALS ;  Surgeon: Kali Teixeira M.D.;  Location: SURGERY [facility];  Service:            Objective:      Tests and Measures: DP and PT pulses strong palpable, Hair growth to RLE present, foot warm and cap refill WNL  12/30/16 c and s taken  Orthotic, protective, supportive devices: No longer in surgical boot per MD, wearing Hookas currently    Fall Risk Assessment (rachel all that apply with an X): Completed 01/13/2017   65 years or older     Fall within the last 2 years, uses   Ambulatory devices  Loss of protective sensation in feet,   Use of prostethic/orthotic, years    Presence of lower extremity/foot/toe amputation   xTaking medication that increases risk (per facility policy)    Interventions Recommended (if any of the above are selected):   Use of Assistive Device:________________________   Supervision with ambulation:   Caregiver   Assistance with ambulation:  Caregiver   xHome safety education:  Educational material provided         Wound Characteristics                                                    Location:  Right Foot Medial Initial Evaluation  Date: 01/13/2017 Encounter note  Date: 01/23/2017   Tissue Type and %: 100% moist pink 100% pale pink tissue   Periwound: intact Intact   Drainage: Minimal serosanguinous Scant ss   Exposed structures None None   Wound Edges:   Open, attached Open   Odor: None None   S&S of Infection:   None None, however,  previous positive wound culture, pt on daily IV abx currently through [facility]   Edema: None Localized   Sensation: intact intact               Measurements:  Right Foot Medial Initial Evaluation  Date: 01/13/2017 Encounter note  Date: 01/23/2017   Length (cm) 0.6 0.5   Width (cm) 2.1 2.5   Depth (cm) 1.0 0.5   Area (cm2) 1.26cm2 1.25cm2   Tract/undermine UM 7-11 0.3cm none        Procedures:  1/20/17: pt placed on vac hold today- continued    Debridement :  Scalpel to remove 1.25 cm2 yellow biofilm.slough   Cleansed with: Normal saline                                                                        Periwound protected with: No sting skin prep, moisture barrier cream   Primary dressing:  Aquacel packing strip into medial wound bed   Secondary Dressing:foam over wound and adhesive foam to lateral suture line which is approximated, to avoid rubbing on boot   Other: Tubigrib size E     Patient Education:  Wound depth is now ~ 0.5 cm, pt given vac break for the weekend and will reassess need for vac on Monday 1/23. Discussed POC, wound care rationale, dressing selection and to return to [facility] three times weekly for appts. Pt instructed to keep dressing CDI and to return to ER for any s/s infection, n/v, fever or chills. Pt verbalized understanding to all.     Professional Collaboration: none today      Assessment:      Wound etiology: surgical    Wound Progress:  Decreased  depth    Rationale for Treatment: AqAg to manage bioburden, absorb exudate, and maintain moist wound environment without laterally wicking exudate therefore reducing mily-wound maceration    Patient tolerance/compliance: Patient tolerated treatment.    Complicating factors: Surgical    Need for ongoing Advanced Wound Care services: Patient requires skilled therapeutic wound care services for product selection, application of product, debridement, close monitoring with clinical assessment for expedite of wound healing.       Plan:      Treatment Plan and Recommendations:  Selective debridement  NPWT    Frequency: 3x/weekly      Treatment Goals: STG 2 Weeks  LTG 4 Weeks   Granulation Tissue: 100% Resolved   Decrease Necrotic Tissue to: 0% 0%   Wound Phase:  proliferation maturation   Decrease Size by: 50% 100%   Periwound:  intact intact   Decrease tracts/undermining by: 100% NA   Decrease Pain:  0/10 0/10       At the time of each visit a thorough assessment of the patient is completed to assure the  appropriateness of our plan of care.  The dressings or modalities may need to be adapted   from the original plan to address any significant changes in the wound environment.          Clinician Signature:_______________________________Date__________________      Physician Signature:______________________________Date:__________________

## 2017-01-25 ENCOUNTER — NON-PROVIDER VISIT (OUTPATIENT)
Dept: WOUND CARE | Facility: MEDICAL CENTER | Age: 27
End: 2017-01-25
Attending: FAMILY MEDICINE
Payer: COMMERCIAL

## 2017-01-25 PROCEDURE — A6212 FOAM DRG <=16 SQ IN W/BORDER: HCPCS

## 2017-01-25 PROCEDURE — 97597 DBRDMT OPN WND 1ST 20 CM/<: CPT

## 2017-01-25 PROCEDURE — A6402 STERILE GAUZE <= 16 SQ IN: HCPCS

## 2017-01-25 NOTE — WOUND TEAM
Advanced Wound Care  [facility]  [address]  [address]  [place] [address]  [phone] Fax: [phone]      Encounter note  For Certification Period: 01/13/2017 - 02/13/2017  Start of Care: 01/13/2017      Referring Physician: WM Cosme Teixeira MD  Primary Physician:  Arianne English  Consulting Physicians:         Wound(s): Right Foot  Pharmacy of Choice:        Subjective:        HPI: 26 year old female, Patient had R. Foot bunion removed on 10/13/16, has had delayed wound healing, patient then went in with Dr. Teixeira again 01/10/2017 to have hardware removed in the right foot.      Pain: Patient denies pain today        Current Medications: no changes to current medications    Allergies:  Allergies   Allergen Reactions   • Cephalexin [Keflex] Hives   • Bactrim Ds [Sulfamethoxazole W-Trimethoprim] Hives        Past Surgical History:   Past Surgical History   Procedure Laterality Date   • Bunionectomy Right 10/13/2016     Right foot   • Hardware removal ortho Right 1/10/2017     Procedure: HARDWARE REMOVAL ORTHO - PLATE/SCREWS 1ST AND 5TH METATARSALS ;  Surgeon: Kali Teixeira M.D.;  Location: SURGERY [facility];  Service:            Objective:      Tests and Measures: DP and PT pulses strong palpable, Hair growth to RLE present, foot warm and cap refill WNL  12/30/16 c and s taken  Orthotic, protective, supportive devices: No longer in surgical boot per MD, wearing Hookas currently    1/25/17 Dr. Teixeira removed sutures and has cleared the patient for WBAT. Pt continues to ambulate with Hookas( boot) and forearm crutches. Dr. Teixeira has consented to d/c of NWPT.     Fall Risk Assessment (rachel all that apply with an X): Completed 01/13/2017   65 years or older     Fall within the last 2 years, uses   Ambulatory devices  Loss of protective sensation in feet,   Use of prostethic/orthotic, years    Presence of lower extremity/foot/toe amputation   xTaking medication that  increases risk (per facility policy)    Interventions Recommended (if any of the above are selected):   Use of Assistive Device:________________________   Supervision with ambulation:  Caregiver   Assistance with ambulation:  Caregiver   Daniella safety education:  Educational material provided         Wound Characteristics                                                    Location:  Right Foot Medial Initial Evaluation  Date: 01/13/2017 Encounter note  Date: 01/25/2017   Tissue Type and %: 100% moist pink 100% pale pink tissue with yellow biofilm   Periwound: intact Intact   Drainage: Minimal serosanguinous Scant ss   Exposed structures None None   Wound Edges:   Open, attached Open, attached   Odor: None None   S&S of Infection:   None None, however,   pt on daily IV abx currently through [facility]   Edema: None Localized   Sensation: intact intact               Measurements:  Right Foot Medial Initial Evaluation  Date: 01/13/2017 Encounter note  Date: 01/23/2017   Length (cm) 0.6 0.5   Width (cm) 2.1 2.5   Depth (cm) 1.0 0.5   Area (cm2) 1.26cm2 1.25cm2   Tract/undermine UM 7-11 0.3cm none        Procedures:  1/25/17: pt d/c vac per Dr. Teixeira consent    Debridement :  Scalpel to remove 1.25 cm2 yellow biofilm   Cleansed with: Normal saline, TAC to decrease itchiness on dorsum of foot                                                                        Periwound protected with: No sting skin prep, moisture barrier cream   Primary dressing:  HoneyGel medial wound bed   Secondary Dressing: non-ad foam over medial wound secured hypafix and adhesive foam to lateral suture line    Other: Tubigrib size C provided with D as extra if C too tight     Patient Education:  Pt educated that wound is no longer appropriate for NWPT. Pt educated to remove TubiC if noticing loss of sensation or blood flow to foot. Discussed vac d/c, POC,  wound care rationale, and dressing selection. Pt verbalized understanding to all.      Professional Collaboration: Vac d/c faxed to Dr. Teixeira. Leisa Green, [facility]      Assessment:      Wound etiology: surgical    Wound Progress:  Decreased depth, 1/25/17 sutures removed per Dr. Teixeira    Rationale for Treatment: AqAg to manage bioburden, absorb exudate, and maintain moist wound environment without laterally wicking exudate therefore reducing mily-wound maceration; Honeygel for autolytic debridement    Patient tolerance/compliance: Patient tolerated treatment.    Complicating factors: Surgical    Need for ongoing Advanced Wound Care services: Patient requires skilled therapeutic wound care services for product selection, application of product, debridement, close monitoring with clinical assessment for expedite of wound healing.       Plan:      Treatment Plan and Recommendations:  Selective debridement  Frequency: 3x/weekly      Treatment Goals: STG 2 Weeks  LTG 4 Weeks   Granulation Tissue: 100% Resolved   Decrease Necrotic Tissue to: 0% 0%   Wound Phase:  proliferation maturation   Decrease Size by: 50% 100%   Periwound:  intact intact   Decrease tracts/undermining by: 100% NA   Decrease Pain:  0/10 0/10       At the time of each visit a thorough assessment of the patient is completed to assure the  appropriateness of our plan of care.  The dressings or modalities may need to be adapted   from the original plan to address any significant changes in the wound environment.          Clinician Signature:_______________________________Date__________________      Physician Signature:______________________________Date:__________________

## 2017-01-27 ENCOUNTER — NON-PROVIDER VISIT (OUTPATIENT)
Dept: WOUND CARE | Facility: MEDICAL CENTER | Age: 27
End: 2017-01-27
Attending: ORTHOPAEDIC SURGERY
Payer: COMMERCIAL

## 2017-01-27 PROCEDURE — A6197 ALGINATE DRSG >16 <=48 SQ IN: HCPCS

## 2017-01-27 PROCEDURE — 97602 WOUND(S) CARE NON-SELECTIVE: CPT

## 2017-01-27 PROCEDURE — 303972 HCHG HYPAFIX RET DRST 18SQ PC"

## 2017-01-27 PROCEDURE — A6212 FOAM DRG <=16 SQ IN W/BORDER: HCPCS

## 2017-01-27 NOTE — WOUND TEAM
Advanced Wound Care  [facility]  [address]  [address]  [place] [address]  [phone] Fax: [phone]      Encounter note  For Certification Period: 01/13/2017 - 02/13/2017  Start of Care: 01/13/2017      Referring Physician: WM Cosme Teixeira MD  Primary Physician:  Arianne English  Consulting Physicians:         Wound(s): Right Foot  Pharmacy of Choice:        Subjective:        HPI: 26 year old female, Patient had R. Foot bunion removed on 10/13/16, has had delayed wound healing, patient then went in with Dr. Teixeira again 01/10/2017 to have hardware removed in the right foot.      Pain: Patient denies pain today        Current Medications: continues on PICC ABX. Currently in week 2/6    Allergies:  Allergies   Allergen Reactions   • Cephalexin [Keflex] Hives   • Bactrim Ds [Sulfamethoxazole W-Trimethoprim] Hives        Past Surgical History:   Past Surgical History   Procedure Laterality Date   • Bunionectomy Right 10/13/2016     Right foot   • Hardware removal ortho Right 1/10/2017     Procedure: HARDWARE REMOVAL ORTHO - PLATE/SCREWS 1ST AND 5TH METATARSALS ;  Surgeon: Kali Teixeira M.D.;  Location: SURGERY [facility];  Service:            Objective:      Tests and Measures: DP and PT pulses strong palpable, Hair growth to RLE present, foot warm and cap refill WNL  12/30/16 c and s taken  Orthotic, protective, supportive devices: No longer in surgical boot per MD, wearing Hookas currently    1/25/17 Dr. Teixeira removed sutures and has cleared the patient for WBAT. Pt continues to ambulate with Hookas( boot) and forearm crutches. Dr. Teixeira has consented to d/c of NWPT.     Fall Risk Assessment (rachel all that apply with an X): Completed 01/13/2017   65 years or older     Fall within the last 2 years, uses   Ambulatory devices  Loss of protective sensation in feet,   Use of prostethic/orthotic, years    Presence of lower extremity/foot/toe amputation   xTaking medication  that increases risk (per facility policy)    Interventions Recommended (if any of the above are selected):   Use of Assistive Device:________________________   Supervision with ambulation:  Caregiver   Assistance with ambulation:  Caregiver   Daniella safety education:  Educational material provided         Wound Characteristics                                                    Location:  Right Foot Medial Initial Evaluation  Date: 01/13/2017 Encounter note  Date: 01/27/2017   Tissue Type and %: 100% moist pink 100% pale pink tissue with yellow biofilm   Periwound: intact Intact   Drainage: Minimal serosanguinous Scant ss   Exposed structures None None   Wound Edges:   Open, attached Open, attached   Odor: None None   S&S of Infection:   None None   Edema: None Localized   Sensation: intact intact               Measurements:  Right Foot Medial Initial Evaluation  Date: 01/13/2017 Encounter note  Date: 01/23/2017   Length (cm) 0.6 0.5   Width (cm) 2.1 2.5   Depth (cm) 1.0 0.5   Area (cm2) 1.26cm2 1.25cm2   Tract/undermine UM 7-11 0.3cm none        Procedures:  1/25/17: pt d/c vac per Dr. Teixeira consent    Debridement :  Cotton tip applicator to remove biofilm.   Cleansed with: Normal saline, cotton tip applicator, gauze,                                                                        Periwound protected with: No sting skin prep, moisture barrier cream, TAC to decrease itchiness on dorsum of foot    Primary dressing:  Honey Alginaid medial wound bed secured with steri strips   Secondary Dressing: non adhesive foam secured with hypafix    Other: Tubigrib size C provided     Patient Education:  Cleared to do full weight baring per Dr. Teixeira 1/25. Sutures also removed 1/25.   Pt educated that wound is no longer appropriate for NWPT. Pt educated to remove TubiC if noticing loss of sensation or blood flow to foot. Discussed vac d/c, POC,  wound care rationale, and dressing selection. Pt verbalized understanding to all.      Professional Collaboration: Candace Win RN      Assessment:      Wound etiology: surgical    Wound Progress:  Decreased depth, 1/25/17 sutures removed per Dr. Teixeira    Rationale for Treatment: AqAg to manage bioburden, absorb exudate, and maintain moist wound environment without laterally wicking exudate therefore reducing mily-wound maceration; Honeygel for autolytic debridement    Patient tolerance/compliance: Patient tolerated treatment.    Complicating factors: Surgical    Need for ongoing Advanced Wound Care services: Patient requires skilled therapeutic wound care services for product selection, application of product, debridement, close monitoring with clinical assessment for expedite of wound healing.       Plan:      Treatment Plan and Recommendations:  Selective debridement  Frequency: 3x/weekly      Treatment Goals: STG 2 Weeks  LTG 4 Weeks   Granulation Tissue: 100% Resolved   Decrease Necrotic Tissue to: 0% 0%   Wound Phase:  proliferation maturation   Decrease Size by: 50% 100%   Periwound:  intact intact   Decrease tracts/undermining by: 100% NA   Decrease Pain:  0/10 0/10       At the time of each visit a thorough assessment of the patient is completed to assure the  appropriateness of our plan of care.  The dressings or modalities may need to be adapted   from the original plan to address any significant changes in the wound environment.          Clinician Signature:_______________________________Date__________________      Physician Signature:______________________________Date:__________________

## 2017-01-30 ENCOUNTER — NON-PROVIDER VISIT (OUTPATIENT)
Dept: WOUND CARE | Facility: MEDICAL CENTER | Age: 27
End: 2017-01-30
Attending: ORTHOPAEDIC SURGERY
Payer: COMMERCIAL

## 2017-01-30 PROCEDURE — A6402 STERILE GAUZE <= 16 SQ IN: HCPCS

## 2017-01-30 PROCEDURE — 303972 HCHG HYPAFIX RET DRST 18SQ PC"

## 2017-01-30 PROCEDURE — 97597 DBRDMT OPN WND 1ST 20 CM/<: CPT

## 2017-01-30 NOTE — WOUND TEAM
Advanced Wound Care  [facility]  [address]  [address]  [place] [address]  [phone] Fax: [phone]      Encounter note  For Certification Period: 01/13/2017 - 02/13/2017  Start of Care: 01/13/2017      Referring Physician: WM Cosme Teixeira MD  Primary Physician:  Arianne English  Consulting Physicians:         Wound(s): Right Foot  Pharmacy of Choice:        Subjective:        HPI: 26 year old female, Patient had R. Foot bunion removed on 10/13/16, has had delayed wound healing, patient then went in with Dr. Teixeira again 01/10/2017 to have hardware removed in the right foot.      Pain: Patient denies pain today        Current Medications: no changes to current medications    Allergies:  Allergies   Allergen Reactions   • Cephalexin [Keflex] Hives   • Bactrim Ds [Sulfamethoxazole W-Trimethoprim] Hives            Objective:      Tests and Measures: DP and PT pulses strong palpable, Hair growth to RLE present, foot warm and cap refill WNL  12/30/16 c and s taken  Orthotic, protective, supportive devices: No longer in surgical boot per MD, wearing Hookas currently    1/25/17 Dr. Teixeira removed sutures and has cleared the patient for WBAT. Pt continues to ambulate with Hookas( boot) and forearm crutches. Dr. Teixeira has consented to d/c of NWPT.     Fall Risk Assessment (rachel all that apply with an X): Completed 01/13/2017   65 years or older     Fall within the last 2 years, uses   Ambulatory devices  Loss of protective sensation in feet,   Use of prostethic/orthotic, years    Presence of lower extremity/foot/toe amputation   xTaking medication that increases risk (per facility policy)    Interventions Recommended (if any of the above are selected):   Use of Assistive Device:________________________   Supervision with ambulation:  Caregiver   Assistance with ambulation:  Caregiver   xHome safety education:  Educational material provided         Wound Characteristics                                                     Location:  Right Foot Medial Initial Evaluation  Date: 01/13/2017 Encounter note  Date: 01/30/2017   Tissue Type and %: 100% moist pink 100% pale pink tissue with yellow biofilm   Periwound: intact Intact   Drainage: Minimal serosanguinous Scant ss   Exposed structures None None   Wound Edges:   Open, attached Open, attached   Odor: None None   S&S of Infection:   None None, however,   pt on daily IV abx currently through [facility]   Edema: None Localized   Sensation: intact intact               Measurements:  Right Foot Medial Initial Evaluation  Date: 01/13/2017 Encounter note  Date: 01/30/2017   Length (cm) 0.6 0.2   Width (cm) 2.1 2.5   Depth (cm) 1.0 0.3   Area (cm2) 1.26cm2 0.5cm2   Tract/undermine UM 7-11 0.3cm none        Procedures:   Debridement :  Scalpel to remove 1.25 cm2 yellow biofilm   Cleansed with: Normal saline, TAC to decrease itchiness on dorsum of foot                                                                        Periwound protected with: No sting skin prep, moisture barrier cream   Primary dressing:  HoneyGel medial wound bed   Secondary Dressing: non-ad foam over medial wound secured hypafix and  lateral suture line RUDDY today   Other: Tubigrib size C provided with D as extra if C too tight     Patient Education:  Continue with current POC    Professional Collaboration:       Assessment:      Wound etiology: surgical    Wound Progress:  Decreased depth and width    Rationale for Treatment: AqAg to manage bioburden, absorb exudate, and maintain moist wound environment without laterally wicking exudate therefore reducing mily-wound maceration; Honeygel for autolytic debridement    Patient tolerance/compliance: Patient tolerated treatment.    Complicating factors: Surgical    Need for ongoing Advanced Wound Care services: Patient requires skilled therapeutic wound care services for product selection, application of product, debridement, close monitoring with  clinical assessment for expedite of wound healing.       Plan:      Treatment Plan and Recommendations:  Selective debridement  Frequency: 3x/weekly      Treatment Goals: STG 2 Weeks  LTG 4 Weeks   Granulation Tissue: 100% Resolved   Decrease Necrotic Tissue to: 0% 0%   Wound Phase:  proliferation maturation   Decrease Size by: 50% 100%   Periwound:  intact intact   Decrease tracts/undermining by: 100% NA   Decrease Pain:  0/10 0/10       At the time of each visit a thorough assessment of the patient is completed to assure the  appropriateness of our plan of care.  The dressings or modalities may need to be adapted   from the original plan to address any significant changes in the wound environment.          Clinician Signature:_______________________________Date__________________      Physician Signature:______________________________Date:__________________

## 2017-02-01 PROCEDURE — A6402 STERILE GAUZE <= 16 SQ IN: HCPCS

## 2017-02-01 PROCEDURE — 97602 WOUND(S) CARE NON-SELECTIVE: CPT

## 2017-02-01 PROCEDURE — 303972 HCHG HYPAFIX RET DRST 18SQ PC"

## 2017-02-01 PROCEDURE — A6212 FOAM DRG <=16 SQ IN W/BORDER: HCPCS

## 2017-02-01 PROCEDURE — A6457 TUBULAR DRESSING: HCPCS

## 2017-02-03 ENCOUNTER — NON-PROVIDER VISIT (OUTPATIENT)
Dept: WOUND CARE | Facility: MEDICAL CENTER | Age: 27
End: 2017-02-03
Attending: ORTHOPAEDIC SURGERY
Payer: COMMERCIAL

## 2017-02-03 PROCEDURE — A6402 STERILE GAUZE <= 16 SQ IN: HCPCS

## 2017-02-03 PROCEDURE — 97602 WOUND(S) CARE NON-SELECTIVE: CPT

## 2017-02-03 PROCEDURE — 303972 HCHG HYPAFIX RET DRST 18SQ PC"

## 2017-02-03 NOTE — WOUND TEAM
Advanced Wound Care  [facility]  [address]  [address]  [place] [address]  [phone] Fax: [phone]      Encounter note  For Certification Period: 01/13/2017 - 02/13/2017  Start of Care: 01/13/2017      Referring Physician: WM Cosme Teixeira MD  Primary Physician:  Arianne English  Consulting Physicians:         Wound(s): Right Foot  Pharmacy of Choice:        Subjective:        HPI: 26 year old female, Patient had R. Foot bunion removed on 10/13/16, has had delayed wound healing, patient then went in with Dr. Teixeira again 01/10/2017 to have hardware removed in the right foot.      Pain: Patient denies pain today        Current Medications: no changes to current medications    Allergies:  Allergies   Allergen Reactions   • Cephalexin [Keflex] Hives   • Bactrim Ds [Sulfamethoxazole W-Trimethoprim] Hives            Objective:      Tests and Measures: DP and PT pulses strong palpable, Hair growth to RLE present, foot warm and cap refill WNL  12/30/16 c and s taken  Orthotic, protective, supportive devices: No longer in surgical boot per MD, wearing Hookas currently    1/25/17 Dr. Teixeira removed sutures and has cleared the patient for WBAT. Pt continues to ambulate with Hookas( boot) and forearm crutches. Dr. Teixeira has consented to d/c of NWPT.     Fall Risk Assessment (rachel all that apply with an X): Completed 01/13/2017   65 years or older     Fall within the last 2 years, uses   Ambulatory devices  Loss of protective sensation in feet,   Use of prostethic/orthotic, years    Presence of lower extremity/foot/toe amputation   xTaking medication that increases risk (per facility policy)    Interventions Recommended (if any of the above are selected):   Use of Assistive Device:________________________   Supervision with ambulation:  Caregiver   Assistance with ambulation:  Caregiver   xHome safety education:  Educational material provided         Wound Characteristics                                                     Location:  Right Foot Medial Initial Evaluation  Date: 01/13/2017 Encounter note  Date: 02/03/2017   Tissue Type and %: 100% moist pink 100% pale pink/red tissue   Periwound: intact Intact   Drainage: Minimal serosanguinous Scant ss   Exposed structures None None   Wound Edges:   Open, attached Open   Odor: None None   S&S of Infection:   None None   Edema: None Localized   Sensation: intact intact               Measurements:  Right Foot Medial Initial Evaluation  Date: 01/13/2017 Encounter note  Date: 01/30/2017   Length (cm) 0.6 0.2   Width (cm) 2.1 2.5   Depth (cm) 1.0 0.3   Area (cm2) 1.26cm2 0.5cm2   Tract/undermine UM 7-11 0.3cm none        Procedures:                  Debridement :  Non selective with gauze and cotton tip applicator to remove biofilm   Cleansed with: Normal saline to wound, no rinse foam cleanser to foot and leg                                                                         Periwound protected with: No sting skin prep, moisture barrier cream, TAC to decrease itchiness on dorsum of foot   Primary dressing:  aquacel packing strip   Secondary Dressing: adhesive foam secured hypafix    Other: Tubigrib size C      Patient Education: Discussed POC, wound care rationale, dressing selection and to return to [facility] twice weekly for appts. Pt instructed to keep dressing CDI and to return to ER for any s/s infection, n/v, fever or chills. Pt continues to follow up with ID and has transitioned to oral Linezolid, from IV abx. Instructed pt to take all abx as prescribed.  Pt verbalized understanding to all.    Professional Collaboration: none today      Assessment:      Wound etiology: surgical    Wound Progress:  All viable tissue, wound is moe    Rationale for Treatment: AqAg to manage bioburden, absorb exudate, and maintain moist wound environment without laterally wicking exudate therefore reducing mily-wound maceration, tubi  to decrease edema    Patient  tolerance/compliance: Patient tolerated treatment well.    Complicating factors: Surgical    Need for ongoing Advanced Wound Care services: Patient requires skilled therapeutic wound care services for product selection, application of product, debridement, close monitoring with clinical assessment for expedite of wound healing.       Plan:      Treatment Plan and Recommendations:  Selective debridement  Frequency: 3x/weekly      Treatment Goals: STG 2 Weeks  LTG 4 Weeks   Granulation Tissue: 100% Resolved   Decrease Necrotic Tissue to: 0% 0%   Wound Phase:  proliferation maturation   Decrease Size by: 50% 100%   Periwound:  intact intact   Decrease tracts/undermining by: 100% NA   Decrease Pain:  0/10 0/10       At the time of each visit a thorough assessment of the patient is completed to assure the  appropriateness of our plan of care.  The dressings or modalities may need to be adapted   from the original plan to address any significant changes in the wound environment.          Clinician Signature:_______________________________Date__________________     Physician Signature:______________________________Date:__________________

## 2017-02-06 ENCOUNTER — NON-PROVIDER VISIT (OUTPATIENT)
Dept: WOUND CARE | Facility: MEDICAL CENTER | Age: 27
End: 2017-02-06
Attending: ORTHOPAEDIC SURGERY
Payer: COMMERCIAL

## 2017-02-06 PROCEDURE — 97602 WOUND(S) CARE NON-SELECTIVE: CPT

## 2017-02-06 PROCEDURE — A6402 STERILE GAUZE <= 16 SQ IN: HCPCS

## 2017-02-06 PROCEDURE — A6212 FOAM DRG <=16 SQ IN W/BORDER: HCPCS

## 2017-02-06 NOTE — WOUND TEAM
Advanced Wound Care  [facility]  [address]  [address]  [place] [address]  [phone] Fax: [phone]      Encounter note  For Certification Period: 01/13/2017 - 02/13/2017  Start of Care: 01/13/2017      Referring Physician: WM Cosme Teixeira MD  Primary Physician:  Arianne English  Consulting Physicians:         Wound(s): Right Foot  Pharmacy of Choice:        Subjective:        HPI: 26 year old female, Patient had R. Foot bunion removed on 10/13/16, has had delayed wound healing, patient then went in with Dr. Teixeira again 01/10/2017 to have hardware removed in the right foot.      Pain: Patient denies pain today        Current Medications: no changes to current medications    Allergies:  Allergies   Allergen Reactions   • Cephalexin [Keflex] Hives   • Bactrim Ds [Sulfamethoxazole W-Trimethoprim] Hives            Objective:      Tests and Measures: DP and PT pulses strong palpable, Hair growth to RLE present, foot warm and cap refill WNL  12/30/16 c and s taken  Orthotic, protective, supportive devices: No longer in surgical boot per MD, wearing Hookas currently    1/25/17 Dr. Teixeira removed sutures and has cleared the patient for WBAT. Pt continues to ambulate with Hookas( boot) and forearm crutches. Dr. Teixeira has consented to d/c of NWPT.     Fall Risk Assessment (rachel all that apply with an X): Completed 01/13/2017   65 years or older     Fall within the last 2 years, uses   Ambulatory devices  Loss of protective sensation in feet,   Use of prostethic/orthotic, years    Presence of lower extremity/foot/toe amputation   xTaking medication that increases risk (per facility policy)    Interventions Recommended (if any of the above are selected):   Use of Assistive Device:________________________   Supervision with ambulation:  Caregiver   Assistance with ambulation:  Caregiver   xHome safety education:  Educational material provided         Wound Characteristics                                                     Location:  Right Foot Medial Initial Evaluation  Date: 01/13/2017 Encounter note  Date: 02/06/2017   Tissue Type and %: 100% moist pink 100% pale pink/red tissue   Periwound: intact Intact   Drainage: Minimal serosanguinous Scant ss   Exposed structures None None   Wound Edges:   Open, attached Open   Odor: None None   S&S of Infection:   None None   Edema: None Localized   Sensation: intact intact               Measurements:  Right Foot Medial Initial Evaluation  Date: 01/13/2017 Encounter note  Date: 02/6/2017   Length (cm) 0.6 0.2   Width (cm) 2.1 1.0   Depth (cm) 1.0 0.3   Area (cm2) 1.26cm2 0.2cm2   Tract/undermine UM 7-11 0.3cm none        Procedures:                  Debridement :  Non selective with gauze and cotton tip applicator to remove biofilm   Cleansed with: Normal saline to wound, no rinse foam cleanser to foot and leg                                                                         Periwound protected with: No sting skin prep   Primary dressing:  aquacel packing strip   Secondary Dressing: adhesive foam secured hypafix, komprex with channels to reduce tissue congestion    Other: Tubigrib size C   Pt is now WBAT in boot.  Dr Teixeira wants pt to perfrom ROM at toes.  Leisa SPT demonstrated some ROM techniques and  performed light A/P mobs to mets     Patient Education: 1/30/17Discussed POC, wound care rationale, dressing selection and to return to C twice weekly for appts. Pt instructed to keep dressing CDI and to return to ER for any s/s infection, n/v, fever or chills. Pt continues to follow up with ID and has transitioned to oral Linezolid, from IV abx. Instructed pt to take all abx as prescribed.  Pt verbalized understanding to all.    Professional Collaboration: none today      Assessment:      Wound etiology: surgical    Wound Progress:  All viable tissue, wound is moe, some local edema addressed with komprex pad    Rationale for Treatment: AqAg to  manage bioburden, absorb exudate, and maintain moist wound environment without laterally wicking exudate therefore reducing mily-wound maceration, tubi  to decrease edema    Patient tolerance/compliance: Patient tolerated treatment well.    Complicating factors: Surgical    Need for ongoing Advanced Wound Care services: Patient requires skilled therapeutic wound care services for product selection, application of product, debridement, close monitoring with clinical assessment for expedite of wound healing.       Plan:      Treatment Plan and Recommendations:  Selective debridement  Frequency: 3x/weekly      Treatment Goals: STG 2 Weeks  LTG 4 Weeks   Granulation Tissue: 100% Resolved   Decrease Necrotic Tissue to: 0% 0%   Wound Phase:  proliferation maturation   Decrease Size by: 50% 100%   Periwound:  intact intact   Decrease tracts/undermining by: 100% NA   Decrease Pain:  0/10 0/10       At the time of each visit a thorough assessment of the patient is completed to assure the  appropriateness of our plan of care.  The dressings or modalities may need to be adapted   from the original plan to address any significant changes in the wound environment.          Clinician Signature:_______________________________Date__________________     Physician Signature:______________________________Date:__________________

## 2017-02-10 ENCOUNTER — NON-PROVIDER VISIT (OUTPATIENT)
Dept: WOUND CARE | Facility: MEDICAL CENTER | Age: 27
End: 2017-02-10
Attending: ORTHOPAEDIC SURGERY
Payer: COMMERCIAL

## 2017-02-10 PROCEDURE — A6402 STERILE GAUZE <= 16 SQ IN: HCPCS

## 2017-02-10 PROCEDURE — 97602 WOUND(S) CARE NON-SELECTIVE: CPT

## 2017-02-10 PROCEDURE — A6457 TUBULAR DRESSING: HCPCS

## 2017-02-10 NOTE — WOUND TEAM
Advanced Wound Care  [facility]  [address]  [address]  [place] [address]  [phone] Fax: [phone]      Encounter note  For Certification Period: 01/13/2017 - 02/13/2017  Start of Care: 01/13/2017      Referring Physician: WM Cosme Teixeira MD  Primary Physician:  Arianne English  Consulting Physicians:         Wound(s): Right Foot  Pharmacy of Choice:        Subjective:        HPI: 26 year old female, Patient had R. Foot bunion removed on 10/13/16, has had delayed wound healing, patient then went in with Dr. Teixeira again 01/10/2017 to have hardware removed in the right foot.      Pain: Patient denies pain today        Current Medications: no changes to current medications    Allergies:  Allergies   Allergen Reactions   • Cephalexin [Keflex] Hives   • Bactrim Ds [Sulfamethoxazole W-Trimethoprim] Hives            Objective:      Tests and Measures: DP and PT pulses strong palpable, Hair growth to RLE present, foot warm and cap refill WNL  12/30/16 c and s taken  Orthotic, protective, supportive devices: No longer in surgical boot per MD, wearing Hookas currently    1/25/17 Dr. Teixeira removed sutures and has cleared the patient for WBAT. Pt continues to ambulate with Hookas( boot) and forearm crutches. Dr. Teixeira has consented to d/c of NWPT.     Fall Risk Assessment (rachel all that apply with an X): Completed 01/13/2017   65 years or older     Fall within the last 2 years, uses   Ambulatory devices  Loss of protective sensation in feet,   Use of prostethic/orthotic, years    Presence of lower extremity/foot/toe amputation   xTaking medication that increases risk (per facility policy)    Interventions Recommended (if any of the above are selected):   Use of Assistive Device:________________________   Supervision with ambulation:  Caregiver   Assistance with ambulation:  Caregiver   xHome safety education:  Educational material provided         Wound Characteristics                                                     Location:  Right Foot Medial Initial Evaluation  Date: 01/13/2017 Encounter note  Date: 02/10/2017   Tissue Type and %: 100% moist pink Appears resolved today with pale pink scar tissue   Periwound: intact    Drainage: Minimal serosanguinous    Exposed structures None    Wound Edges:   Open, attached    Odor: None    S&S of Infection:   None    Edema: None    Sensation: intact                Measurements:  Right Foot Medial Initial Evaluation  Date: 01/13/2017 Encounter note  Date: 02/6/2017   Length (cm) 0.6 resolved   Width (cm) 2.1    Depth (cm) 1.0    Area (cm2) 1.26cm2    Tract/undermine UM 7-11 0.3cm         Procedures:                  Debridement :  Non selective with gauze and cotton tip applicator to remove crusted biofilm   Cleansed with: Normal saline to wound, no rinse foam cleanser to foot and leg                                                                         Periwound protected with: sween moisturizer   Primary dressing:  none   Secondary Dressing: none   Other: Tubigrib size C      Patient Education: Discussed POC, wound care rationale, dressing selection and to return to [facility] twice weekly for appts, but will likely be discharged next visit if wound remains closed. Pt has two small intact blisters that appear to be from foam. Wound left RUDDY, except for tubi  sleeve per pt request. Pt instructed to moisturize area twice daily and to cleanse gently, and dry area thoroughly.  Pt continues to follow up with ID and has transitioned to oral Linezolid, from IV abx. Instructed pt to take all abx as prescribed.  Pt verbalized understanding to all.    Professional Collaboration: none today      Assessment:      Wound etiology: surgical    Wound Progress:  Wound appears resolved today    Rationale for Treatment: moisturizer to keep skin soft,  tubi  to decrease edema    Patient tolerance/compliance: Patient tolerated treatment well.    Complicating factors:  Surgical    Need for ongoing Advanced Wound Care services: Patient requires skilled therapeutic wound care services for product selection, application of product, debridement, close monitoring with clinical assessment for expedite of wound healing.       Plan:      Treatment Plan and Recommendations:  Selective debridement  Frequency: 3x/weekly      Treatment Goals: STG 2 Weeks  LTG 4 Weeks   Granulation Tissue: 100% Resolved   Decrease Necrotic Tissue to: 0% 0%   Wound Phase:  proliferation maturation   Decrease Size by: 50% 100%   Periwound:  intact intact   Decrease tracts/undermining by: 100% NA   Decrease Pain:  0/10 0/10       At the time of each visit a thorough assessment of the patient is completed to assure the  appropriateness of our plan of care.  The dressings or modalities may need to be adapted   from the original plan to address any significant changes in the wound environment.          Clinician Signature:_______________________________Date__________________     Physician Signature:______________________________Date:__________________

## 2017-02-13 ENCOUNTER — NON-PROVIDER VISIT (OUTPATIENT)
Dept: WOUND CARE | Facility: MEDICAL CENTER | Age: 27
End: 2017-02-13
Attending: ORTHOPAEDIC SURGERY
Payer: COMMERCIAL

## 2017-02-13 PROCEDURE — 99211 OFF/OP EST MAY X REQ PHY/QHP: CPT

## 2017-02-13 NOTE — CERTIFICATION
Advanced Wound Care  [facility]  [address]  [address]  [place] [address]  [phone] Fax: [phone]      Discharge Summary  For Certification Period: 01/13/2017 - 02/13/2017  Start of Care: 01/13/2017    Pt instr dc today as wound is resolved. Educated to keep area clean, it will be fragile for a few days, bathe and dry area gently, only ever regains a maximum of 80% of the tensile strength of the surrounding skin, remodeling of scar can continue for 6mo - a year. Contact PCP for a referral back here if any problems with area opening and draining again. Pt verbalizes agreement and understanding. Pt states she will follow up with physician for questions with boot and PT questions.

## 2017-02-17 ENCOUNTER — APPOINTMENT (OUTPATIENT)
Dept: WOUND CARE | Facility: MEDICAL CENTER | Age: 27
End: 2017-02-17
Attending: ORTHOPAEDIC SURGERY
Payer: COMMERCIAL

## 2017-02-21 ENCOUNTER — APPOINTMENT (OUTPATIENT)
Dept: WOUND CARE | Facility: MEDICAL CENTER | Age: 27
End: 2017-02-21
Attending: ORTHOPAEDIC SURGERY
Payer: COMMERCIAL

## 2017-02-24 ENCOUNTER — APPOINTMENT (OUTPATIENT)
Dept: WOUND CARE | Facility: MEDICAL CENTER | Age: 27
End: 2017-02-24
Attending: ORTHOPAEDIC SURGERY
Payer: COMMERCIAL

## 2017-02-28 ENCOUNTER — APPOINTMENT (OUTPATIENT)
Dept: WOUND CARE | Facility: MEDICAL CENTER | Age: 27
End: 2017-02-28
Attending: ORTHOPAEDIC SURGERY
Payer: COMMERCIAL

## 2017-03-03 ENCOUNTER — APPOINTMENT (OUTPATIENT)
Dept: WOUND CARE | Facility: MEDICAL CENTER | Age: 27
End: 2017-03-03
Payer: COMMERCIAL

## 2017-04-12 DIAGNOSIS — Z01.812 PRE-OPERATIVE LABORATORY EXAMINATION: ICD-10-CM

## 2017-04-12 LAB
ERYTHROCYTE [DISTWIDTH] IN BLOOD BY AUTOMATED COUNT: 47.9 FL (ref 35.9–50)
HCT VFR BLD AUTO: 41.5 % (ref 37–47)
HGB BLD-MCNC: 13.5 G/DL (ref 12–16)
MCH RBC QN AUTO: 30.8 PG (ref 27–33)
MCHC RBC AUTO-ENTMCNC: 32.5 G/DL (ref 33.6–35)
MCV RBC AUTO: 94.5 FL (ref 81.4–97.8)
PLATELET # BLD AUTO: 251 K/UL (ref 164–446)
PMV BLD AUTO: 11.5 FL (ref 9–12.9)
RBC # BLD AUTO: 4.39 M/UL (ref 4.2–5.4)
SCCMEC + MECA PNL NOSE NAA+PROBE: NEGATIVE
SCCMEC + MECA PNL NOSE NAA+PROBE: NEGATIVE
WBC # BLD AUTO: 7.1 K/UL (ref 4.8–10.8)

## 2017-04-12 PROCEDURE — 85027 COMPLETE CBC AUTOMATED: CPT

## 2017-04-12 PROCEDURE — 87641 MR-STAPH DNA AMP PROBE: CPT

## 2017-04-12 PROCEDURE — 36415 COLL VENOUS BLD VENIPUNCTURE: CPT

## 2017-04-12 PROCEDURE — 87640 STAPH A DNA AMP PROBE: CPT

## 2017-04-12 RX ORDER — DIPHENHYDRAMINE HYDROCHLORIDE 25 MG/1
1 TABLET ORAL DAILY
COMMUNITY
End: 2022-10-13

## 2017-04-20 ENCOUNTER — HOSPITAL ENCOUNTER (OUTPATIENT)
Facility: MEDICAL CENTER | Age: 27
End: 2017-04-20
Attending: ORTHOPAEDIC SURGERY | Admitting: ORTHOPAEDIC SURGERY
Payer: COMMERCIAL

## 2017-04-20 VITALS
BODY MASS INDEX: 22.27 KG/M2 | HEART RATE: 83 BPM | TEMPERATURE: 98.6 F | HEIGHT: 69 IN | DIASTOLIC BLOOD PRESSURE: 68 MMHG | OXYGEN SATURATION: 96 % | SYSTOLIC BLOOD PRESSURE: 121 MMHG | WEIGHT: 150.35 LBS | RESPIRATION RATE: 16 BRPM

## 2017-04-20 PROBLEM — T84.398A: Status: RESOLVED | Noted: 2017-01-10 | Resolved: 2017-04-20

## 2017-04-20 PROBLEM — M20.12 ACQUIRED HALLUX VALGUS OF LEFT FOOT: Status: ACTIVE | Noted: 2017-04-20

## 2017-04-20 LAB
HCG UR QL: NEGATIVE
SP GR UR REFRACTOMETRY: 1.02

## 2017-04-20 PROCEDURE — 160039 HCHG SURGERY MINUTES - EA ADDL 1 MIN LEVEL 3: Performed by: ORTHOPAEDIC SURGERY

## 2017-04-20 PROCEDURE — A4606 OXYGEN PROBE USED W OXIMETER: HCPCS | Performed by: ORTHOPAEDIC SURGERY

## 2017-04-20 PROCEDURE — 110371 HCHG SHELL REV 272: Performed by: ORTHOPAEDIC SURGERY

## 2017-04-20 PROCEDURE — 700101 HCHG RX REV CODE 250

## 2017-04-20 PROCEDURE — 502240 HCHG MISC OR SUPPLY RC 0272: Performed by: ORTHOPAEDIC SURGERY

## 2017-04-20 PROCEDURE — 160002 HCHG RECOVERY MINUTES (STAT): Performed by: ORTHOPAEDIC SURGERY

## 2017-04-20 PROCEDURE — 160028 HCHG SURGERY MINUTES - 1ST 30 MINS LEVEL 3: Performed by: ORTHOPAEDIC SURGERY

## 2017-04-20 PROCEDURE — 501838 HCHG SUTURE GENERAL: Performed by: ORTHOPAEDIC SURGERY

## 2017-04-20 PROCEDURE — 160048 HCHG OR STATISTICAL LEVEL 1-5: Performed by: ORTHOPAEDIC SURGERY

## 2017-04-20 PROCEDURE — 500135 HCHG BURR, EGG 4.0 (ORAL): Performed by: ORTHOPAEDIC SURGERY

## 2017-04-20 PROCEDURE — A6222 GAUZE <=16 IN NO W/SAL W/O B: HCPCS | Performed by: ORTHOPAEDIC SURGERY

## 2017-04-20 PROCEDURE — 110382 HCHG SHELL REV 271: Performed by: ORTHOPAEDIC SURGERY

## 2017-04-20 PROCEDURE — 160025 RECOVERY II MINUTES (STATS): Performed by: ORTHOPAEDIC SURGERY

## 2017-04-20 PROCEDURE — 160047 HCHG PACU  - EA ADDL 30 MINS PHASE II: Performed by: ORTHOPAEDIC SURGERY

## 2017-04-20 PROCEDURE — 160022 HCHG BLOCK: Performed by: ORTHOPAEDIC SURGERY

## 2017-04-20 PROCEDURE — 500881 HCHG PACK, EXTREMITY: Performed by: ORTHOPAEDIC SURGERY

## 2017-04-20 PROCEDURE — 700111 HCHG RX REV CODE 636 W/ 250 OVERRIDE (IP)

## 2017-04-20 PROCEDURE — A6454 SELF-ADHER BAND W>=3" <5"/YD: HCPCS | Performed by: ORTHOPAEDIC SURGERY

## 2017-04-20 PROCEDURE — A9270 NON-COVERED ITEM OR SERVICE: HCPCS | Performed by: ORTHOPAEDIC SURGERY

## 2017-04-20 PROCEDURE — 81025 URINE PREGNANCY TEST: CPT

## 2017-04-20 PROCEDURE — 160009 HCHG ANES TIME/MIN: Performed by: ORTHOPAEDIC SURGERY

## 2017-04-20 PROCEDURE — 500053 HCHG BANDAGE, ELASTIC 4: Performed by: ORTHOPAEDIC SURGERY

## 2017-04-20 PROCEDURE — 500431 HCHG DRESSING, KLING 2: Performed by: ORTHOPAEDIC SURGERY

## 2017-04-20 PROCEDURE — 160035 HCHG PACU - 1ST 60 MINS PHASE I: Performed by: ORTHOPAEDIC SURGERY

## 2017-04-20 PROCEDURE — 160046 HCHG PACU - 1ST 60 MINS PHASE II: Performed by: ORTHOPAEDIC SURGERY

## 2017-04-20 PROCEDURE — 160036 HCHG PACU - EA ADDL 30 MINS PHASE I: Performed by: ORTHOPAEDIC SURGERY

## 2017-04-20 PROCEDURE — 700111 HCHG RX REV CODE 636 W/ 250 OVERRIDE (IP): Performed by: ORTHOPAEDIC SURGERY

## 2017-04-20 PROCEDURE — 500049 HCHG BANDAGE, ACE ELASTIC 2: Performed by: ORTHOPAEDIC SURGERY

## 2017-04-20 PROCEDURE — 700102 HCHG RX REV CODE 250 W/ 637 OVERRIDE(OP): Performed by: ORTHOPAEDIC SURGERY

## 2017-04-20 RX ORDER — DEXAMETHASONE SODIUM PHOSPHATE 4 MG/ML
4 INJECTION, SOLUTION INTRA-ARTICULAR; INTRALESIONAL; INTRAMUSCULAR; INTRAVENOUS; SOFT TISSUE
Status: DISCONTINUED | OUTPATIENT
Start: 2017-04-20 | End: 2017-04-20 | Stop reason: HOSPADM

## 2017-04-20 RX ORDER — HALOPERIDOL 5 MG/ML
1 INJECTION INTRAMUSCULAR EVERY 6 HOURS PRN
Status: DISCONTINUED | OUTPATIENT
Start: 2017-04-20 | End: 2017-04-20 | Stop reason: HOSPADM

## 2017-04-20 RX ORDER — ONDANSETRON 2 MG/ML
4 INJECTION INTRAMUSCULAR; INTRAVENOUS EVERY 4 HOURS PRN
Status: DISCONTINUED | OUTPATIENT
Start: 2017-04-20 | End: 2017-04-20 | Stop reason: HOSPADM

## 2017-04-20 RX ORDER — DIPHENHYDRAMINE HYDROCHLORIDE 50 MG/ML
25 INJECTION INTRAMUSCULAR; INTRAVENOUS EVERY 6 HOURS PRN
Status: DISCONTINUED | OUTPATIENT
Start: 2017-04-20 | End: 2017-04-20 | Stop reason: HOSPADM

## 2017-04-20 RX ORDER — SCOLOPAMINE TRANSDERMAL SYSTEM 1 MG/1
1 PATCH, EXTENDED RELEASE TRANSDERMAL
Status: DISCONTINUED | OUTPATIENT
Start: 2017-04-20 | End: 2017-04-20 | Stop reason: HOSPADM

## 2017-04-20 RX ORDER — LIDOCAINE HYDROCHLORIDE 10 MG/ML
INJECTION, SOLUTION INFILTRATION; PERINEURAL
Status: COMPLETED
Start: 2017-04-20 | End: 2017-04-20

## 2017-04-20 RX ORDER — SODIUM CHLORIDE, SODIUM LACTATE, POTASSIUM CHLORIDE, CALCIUM CHLORIDE 600; 310; 30; 20 MG/100ML; MG/100ML; MG/100ML; MG/100ML
1000 INJECTION, SOLUTION INTRAVENOUS
Status: DISCONTINUED | OUTPATIENT
Start: 2017-04-20 | End: 2017-04-20 | Stop reason: HOSPADM

## 2017-04-20 RX ADMIN — LIDOCAINE HYDROCHLORIDE 0.2 ML: 10 INJECTION, SOLUTION INFILTRATION; PERINEURAL at 06:45

## 2017-04-20 ASSESSMENT — PAIN SCALES - GENERAL
PAINLEVEL_OUTOF10: 0
PAINLEVEL_OUTOF10: ASSUMED PAIN PRESENT
PAINLEVEL_OUTOF10: 0

## 2017-04-20 NOTE — OR NURSING
Name, birthday, allergies, NPO status, last void, medication rec, surgical site and procedure verified with patient.  Pt belongings collected and secured in locker.  Pt verbalizes understanding of pain scale, expected plan of care and course of stay.  IV established. Sequentials placed to non op leg.

## 2017-04-20 NOTE — IP AVS SNAPSHOT
4/20/2017    Merna Ríos  [address] [place] [address]    Dear Merna:    [facility] wants to ensure your discharge home is safe and you or your loved ones have had all of your questions answered regarding your care after you leave the hospital.    Below is a list of resources and contact information should you have any questions regarding your hospital stay, follow-up instructions, or active medical symptoms.    Questions or Concerns Regarding… Contact   Medical Questions Related to Your Discharge  (7 days a week, 8am-5pm) Contact a Nurse Care Coordinator   [phone]   Medical Questions Not Related to Your Discharge  (24 hours a day / 7 days a week)  Contact the Nurse Health Line   [phone]    Medications or Discharge Instructions Refer to your discharge packet   or contact your [facility] Primary Care Provider   [phone]   Follow-up Appointment(s) Schedule your appointment via [employer]   or contact Scheduling [phone]   Billing Review your statement via [employer]  or contact Billing [phone]   Medical Records Review your records via [employer]   or contact Medical Records [phone]     You may receive a telephone call within two days of discharge. This call is to make certain you understand your discharge instructions and have the opportunity to have any questions answered. You can also easily access your medical information, test results and upcoming appointments via the [employer] free online health management tool. You can learn more and sign up at [employer]etherPal. For assistance setting up your [employer] account, please call [phone].    Once again, we want to ensure your discharge home is safe and that you have a clear understanding of any next steps in your care. If you have any questions or concerns, please do not hesitate to contact us, we are here for you. Thank you for choosing [facility] for your healthcare needs.    Sincerely,    Your [facility] Healthcare Team

## 2017-04-20 NOTE — OR SURGEON
Immediate Post-Operative Note      PreOp Diagnosis: Left bunion    PostOp Diagnosis: same    Procedure(s):  Left distal bunionectomy (distal metatarsal osteotomy)    Surgeon(s):  Kali Teixeira M.D.    Anesthesiologist/Type of Anesthesia:  Anesthesiologist: Alvarado Mathis M.D./General    Surgical Staff:  Circulator: Gabriella Vences R.N.; Ashley Dai R.N.  Scrub Person: Rex Valadez    Dict# [id number]      4/20/2017 8:48 AM Kali Teixeira

## 2017-04-20 NOTE — OP REPORT
DATE OF SERVICE:  04/20/2017    SURGEON:  Kali Teixeira MD    ANESTHESIOLOGIST:  Alvarado Mathis MD    PREOPERATIVE DIAGNOSIS:  Left bunion.    POSTOPERATIVE DIAGNOSIS:  Left bunion.    OPERATIVE PROCEDURE:  Left bunionectomy (distal metatarsal osteotomy with   distal soft tissue bunion).    DESCRIPTION OF PROCEDURE:  The patient was seen in the preop holding area.    She was skin scribed and received a sciatic level block by anesthesia.    Intraoperatively, general anesthetic was utilized.  Once carefully positioned,   a prep and drape was undertaken and a time-out was performed at this point   including confirmation.  She had received IV Ancef within an hour of surgical   cut time.    The foot was exsanguinated with an Esmarch wrapped 3 turns at the ankles and   operative tourniquet and all dissection was performed with loupe   magnification.    A 2 incision approach was performed with a first webspace incision with   release of the lateral sesamoid constraints in the intermetatarsal ligament as   well as puncture perforation of the lateral capsule.  Care was taken during   this dissection to perfect the deep peroneal nerve branches.    A second incision was performed with a dorsomedial aspect of the first MTP   joint, again, we care to protect the saphenous vein and the dorsal cutaneous   median nerve.  An inverted L capsulotomy was performed and attention was   directed to elevation of the capsule and exposure of medial eminence, which   was resected in line with the shaft.  I then performed a 60-degree Chevron   osteotomy and translated the articular surface laterally 4-5 mm with excellent   impaction.  I took through range of motion, there was no perceived   instability and no hardware was indicated.  Resulting prominence following the   translation was taken down with the bur, and the wound was heavily irrigated.    Closure of the capsule was performed with the toe held in a reduced position   with a  simple goal was in the first webspace.  Capsular closure was performed   with deep Vicryl.  At this point, I examined the toe, took through range of   motion and noted to be stable without external constraints.  Second deep layer   was performed with 3-0 Vicryl.  Due to reactivity of Vicryl with a prior surgery, I avoided the skin subcuticular Vicryl closure.    I did place Monocryl with deep ties on both ends, I then placed a few nylon   sutures for the final skin closure.  The toe remained in good position and the   tourniquet was released and brisk capillary refill was noted.  A dressing was   applied with the toe in appropriate position.  Currently, patient's recovery   continued, good vascularity and evidence of strong operative block.       ____________________________________     MD ROSALES Henriquez / PHILIP    DD:  04/20/2017 08:52:08  DT:  04/20/2017 09:23:16    D#:  [id number]  Job#:  [id number]

## 2017-04-20 NOTE — DISCHARGE INSTRUCTIONS
ACTIVITY: Rest and take it easy for the first 24 hours.  A responsible adult is recommended to remain with you during that time.  It is normal to feel sleepy.  We encourage you to not do anything that requires balance, judgment or coordination.    MILD FLU-LIKE SYMPTOMS ARE NORMAL. YOU MAY EXPERIENCE GENERALIZED MUSCLE ACHES, THROAT IRRITATION, HEADACHE AND/OR SOME NAUSEA.    FOR 24 HOURS DO NOT:  Drive, operate machinery or run household appliances.  Drink beer or alcoholic beverages.   Make important decisions or sign legal documents.    SPECIAL INSTRUCTIONS: Non weight bearing with crutches or scooter    DIET: To avoid nausea, slowly advance diet as tolerated, avoiding spicy or greasy foods for the first day.  Add more substantial food to your diet according to your physician's instructions.  Babies can be fed formula or breast milk as soon as they are hungry.  INCREASE FLUIDS AND FIBER TO AVOID CONSTIPATION.    SURGICAL DRESSING/BATHING: Per Dr Teixeira instructions    FOLLOW-UP APPOINTMENT:  A follow-up appointment should be arranged with your doctor in call to schedule    You should CALL YOUR PHYSICIAN if you develop:  Fever greater than 101 degrees F.  Pain not relieved by medication, or persistent nausea or vomiting.  Excessive bleeding (blood soaking through dressing) or unexpected drainage from the wound.  Extreme redness or swelling around the incision site, drainage of pus or foul smelling drainage.  Inability to urinate or empty your bladder within 8 hours.  Problems with breathing or chest pain.    You should call 911 if you develop problems with breathing or chest pain.  If you are unable to contact your doctor or surgical center, you should go to the nearest emergency room or urgent care center.  Physician's telephone #: [phone]    If any questions arise, call your doctor.  If your doctor is not available, please feel free to call the Surgical Center at [phone].  The Center is open Monday  through Friday from 7AM to 7PM.  You can also call the HEALTH HOTLINE open 24 hours/day, 7 days/week and speak to a nurse at [phone], or toll free at [phone].    A registered nurse may call you a few days after your surgery to see how you are doing after your procedure.    MEDICATIONS: Resume taking daily medication.  Take prescribed pain medication with food.  If no medication is prescribed, you may take non-aspirin pain medication if needed.  PAIN MEDICATION CAN BE VERY CONSTIPATING.  Take a stool softener or laxative such as senokot, pericolace, or milk of magnesia if needed.    Prescription at home.  Last pain medication given at .    If your physician has prescribed pain medication that includes Acetaminophen (Tylenol), do not take additional Acetaminophen (Tylenol) while taking the prescribed medication.    Depression / Suicide Risk    As you are discharged from this [facility] facility, it is important to learn how to keep safe from harming yourself.    Recognize the warning signs:  · Abrupt changes in personality, positive or negative- including increase in energy   · Giving away possessions  · Change in eating patterns- significant weight changes-  positive or negative  · Change in sleeping patterns- unable to sleep or sleeping all the time   · Unwillingness or inability to communicate  · Depression  · Unusual sadness, discouragement and loneliness  · Talk of wanting to die  · Neglect of personal appearance   · Rebelliousness- reckless behavior  · Withdrawal from people/activities they love  · Confusion- inability to concentrate     If you or a loved one observes any of these behaviors or has concerns about self-harm, here's what you can do:  · Talk about it- your feelings and reasons for harming yourself  · Remove any means that you might use to hurt yourself (examples: pills, rope, extension cords, firearm)  · Get professional help from the community (Mental Health, Substance Abuse,  psychological counseling)  · Do not be alone:Call your Safe Contact- someone whom you trust who will be there for you.  · Call your local CRISIS HOTLINE 693-5249 or 208-746-0640  · Call your local Children's Mobile Crisis Response Team [place] (550) 361-4932 or www.Threat Stack  · Call the toll free National Suicide Prevention Hotlines   · National Suicide Prevention Lifeline 790-241-WUDN (1498)  [place] Line Network 800-SUICIDE (218-3076)    Peripheral Nerve Block Discharge Instructions from Same Day Surgery and Inpatient :    What to Expect - Lower Extremity  · The block may cause you to experience numbness and weakness in your {LEG LOCATION [id number]} on the same side as your surgery  · Numbness, tingling and / or weakness are all normal. For some people, this may be an unpleasant sensation  · These issues will be resolved when the local anesthetic wears off   · You may experience numbness and tingling in your thigh on the same side as your surgery if the block medicine was injected at your groin area  · Numbness will make it difficult to walk  · You may have problems with balance and walking so be very careful   · Follow your surgeon's direction regarding weight bearing on your surgical limb  · Be very careful with your numb limb  Precautions  · The numbness may affect your balance  · Be careful when walking or moving around  · Your leg may be weak: be very careful putting weight on it  · If your surgeon did not specify a time, you should not bear weight for 24 hours  · Be sure to ask for help when you need it  · It is better to have help than to fall and hurt yourself    · Protect the limb like a baby  · Beware of exposing your limb to extreme heat or cold or trauma  · The limb may be injured without you noticing because it is numb  · Keep the limb elevated whenever possible  · Do not sleep on the limb  · Change the position of the limb regularly  · Avoid putting pressure on your surgical  "limb  Pain Control  · The initial block on the day of surgery will make your extremity feel \"numb\"  · Any consecutive injection including prior to discharge from the hospital will make your extremity feel \"numb\"  · You may feel an aching or burning when the local anesthesia starts to wear off  · Take pain pills as prescribed by your surgeon  · Call your surgeon or anesthesiologist if you do not have adequate pain control  ·   "

## 2017-04-20 NOTE — OR NURSING
0940 patient to stage 2  Patient settled in recliner chair post short ambulation from [facility] - pt dressed with assist by CNA. Pt awake and denies pain or nausea. L toes pink/warm with small, movement. LLE elevated in recliner and dressing CDI with surgical boot in place. Ice pack to L ankle.    1020 Reviewed discharge instructions and verbal instructions given to never put ice pack to toes; pt/family states verbal understanding. Pt declines crutch training due to recent previous experience.   1032 D/Brian to care of family post uneventful stay in PACU 2.

## 2017-04-20 NOTE — OR NURSING
0826 received from or  resp spont with oral airway intact  Left boot intact  Dressing c/d/i  Toes warm  Elevated  Ice pack [id number] remains painfree  Dressing c/d/i  Meets discharge criteria

## 2017-09-08 NOTE — PROGRESS NOTES
Detail Level: Detailed PICC Insertion Procedure Note    Consents confirmed, vessel patency confirmed with ultrasound. Risks and benefits of procedure explained to patient/family and education regarding central line associated bloodstream infections provided. Questions answered.     PICC placed in RUE per MD order with ultrasound guidance. 4 Fr, SINGLE lumen PICC placed in BASILIC vein after ONE attempt(s). 1 cc's of 1% lidocaine injected intradermally, 21 gauge microintroducer needle and modified Seldinger technique used. 37 cm total catheter length, 1 cm external measurement inserted with good blood return. Each lumen flushed without resistance with 10 mL 0.9% normal saline. PICC line secured with Biopatch, statlock and Tegaderm.    PICC tip location in the SVC confirmed by ECG technology. Pt tolerated procedure WELL.  Patient condition relayed to unit RN or ordering physician via this post procedure note in the EMR.     Mobile Accord Power PICC ref # [id number], Lot # [id number]    All guidewires removed.    Report to GAGANDEEP Liriano for patient, on above.   Detail Level: Zone

## 2019-04-02 NOTE — OR NURSING
Department of Anesthesiology  Preprocedure Note       Name:  Shana Dubose   Age:  45 y.o.  :  1966                                          MRN:  [id number]         Date:  2019      Surgeon: Mary Najera):  Jory Mena MD    Procedure: EGD ESOPHAGOGASTRODUODENOSCOPY (N/A )    Medications prior to admission:   Prior to Admission medications    Medication Sig Start Date End Date Taking? Authorizing Provider   cloNIDine (CATAPRES) 0.1 MG/24HR Place 1 patch onto the skin once a week Changes    Yes Historical Provider, MD   atenolol (TENORMIN) 100 MG tablet Take 100 mg by mouth daily   Yes Historical Provider, MD   CLONIDINE HCL PO Take 0.2 mg by mouth 2 times daily    Yes Historical Provider, MD   HYDROcodone-acetaminophen (NORCO)  MG per tablet Take 1 tablet by mouth every 6 hours as needed for Pain . Patient taking differently: Take 1 tablet by mouth every 6 hours as needed for Pain. Ran out. 17  Yes Benn Boxer,    albuterol sulfate HFA (PROAIR HFA) 108 (90 Base) MCG/ACT inhaler Inhale 2 puffs into the lungs every 6 hours as needed for Wheezing 3/15/19 3/22/19  Antoni Silence Cardinal, DO   benzonatate (TESSALON) 100 MG capsule Take 1 capsule by mouth 3 times daily as needed for Cough 3/15/19   Antoni Silence Cardinal, DO   Iyjeuka-Nuuqxbh-Hvydqfpj-Tenof (STRIBILD PO) Take 100 mg by mouth daily    Historical Provider, MD   zolpidem (AMBIEN) 5 MG tablet Take 5 mg by mouth nightly as needed.     Historical Provider, MD       Current medications:    Current Facility-Administered Medications   Medication Dose Route Frequency Provider Last Rate Last Dose    sodium chloride flush 0.9 % injection 10 mL  10 mL Intravenous 2 times per day Irvin Lavender, DO        sodium chloride flush 0.9 % injection 10 mL  10 mL Intravenous PRN Irvin Lavender, DO        pantoprazole (PROTONIX) injection 40 mg  40 mg Intravenous BID Irvin Lavender, DO   40 mg at 19 1123    And    sodium chloride (PF) Patient in preop, allergies and NPO status verified, home med rec completed and belongings secured. Patient verbalizes understanding of pain scale, expected course of stay and plan of care. Surgical site verified. IV access initiated, SCD placed on left leg.     0.9 % injection 10 mL  10 mL Intravenous BID Lien Fountain, DO   10 mL at 04/02/19 1124    atenolol (TENORMIN) tablet 100 mg  100 mg Oral Daily Unice Deist, DO   100 mg at 04/02/19 0849    elvitegravir-cobicistat-emtricitabine-tenofovir (STRIBILD) [phone] MG 1 tablet  1 tablet Oral Daily Unice Deist, DO   1 tablet at 04/02/19 0849    HYDROcodone-acetaminophen (NORCO)  MG per tablet 1 tablet  1 tablet Oral Q6H PRN Unice Deist, DO   1 tablet at 04/02/19 0849    zolpidem (AMBIEN) tablet 5 mg  5 mg Oral Nightly PRN Unice Deist, DO        lactated ringers infusion   Intravenous Continuous Unice Deist,  mL/hr at 04/02/19 0958      ipratropium-albuterol (DUONEB) nebulizer solution 1 ampule  1 ampule Inhalation Q4H WA Unice Deist, DO   1 ampule at 04/02/19 1458    ondansetron (ZOFRAN) injection 4 mg  4 mg Intravenous Q6H PRN Unice Deist, DO   4 mg at 04/01/19 1455    enoxaparin (LOVENOX) injection 40 mg  40 mg Subcutaneous Daily Unice Deist, DO   40 mg at 04/01/19 1717    nystatin (MYCOSTATIN) [id number] UNIT/ML suspension 500,000 Units  5 mL Oral 4x Daily Unice Deist, DO   500,000 Units at 04/02/19 0848    cloNIDine (CATAPRES) tablet 0.2 mg  0.2 mg Oral BID Unice Deist, DO   0.2 mg at 04/02/19 8970    benzonatate (TESSALON) capsule 200 mg  200 mg Oral TID PRN Ada Tanner, DO        fluconazole (DIFLUCAN) in 0.9 % sodium chloride IVPB 400 mg  400 mg Intravenous Once Ada Tanner,  mL/hr at 04/01/19 1800      Followed by   Iris Hall fluconazole (DIFLUCAN) in 0.9 % sodium chloride IVPB 200 mg  200 mg Intravenous Q24H Ada Tanner,  mL/hr at 04/02/19 1519 200 mg at 04/02/19 1519    iopamidol (ISOVUE-370) 76 % injection 80 mL  80 mL Intravenous ONCE PRN Zoë Kendrick MD        vancomycin (VANCOCIN) 750 mg in dextrose 5 % 250 mL IVPB  750 mg Intravenous Q12H Saúl Bourgeois DO   Stopped at 04/02/19 1327    piperacillin-tazobactam (ZOSYN) 3.375 g in dextrose 5 % 50 mL IVPB extended infusion (mini-bag)  3.375 g Intravenous Q8H Ildefonso Hilliard DO   Stopped at 04/02/19 1519       Allergies:     Allergies   Allergen Reactions    Aspirin Other (See Comments)     Causes  Rectal bleeding    Ketorolac Tromethamine Rash    Morphine And Related [Codeine] Rash       Problem List:    Patient Active Problem List   Diagnosis Code    Chronic pain syndrome G89.4    Chronic low back pain M54.5, G89.29    Lumbar radiculopathy M54.16    Myofascial pain M79.18    HIV (human immunodeficiency virus infection) ([facility] [address] B20    Sepsis ([facility] [address] A41.9    Severe protein-calorie malnutrition ([facility] [address] E43       Past Medical History:        Diagnosis Date    Arthritis     Back pain     Cancer ([facility] [address] 2003    rectal    History of cardiovascular stress test 09/14/2017    Lexiscan stress test    HIV disease ([facility] [address]     Hypertension        Past Surgical History:        Procedure Laterality Date    CHOLECYSTECTOMY      COLON SURGERY      COLONOSCOPY  2/27/13    random biopsies  DR Stock    DIAGNOSTIC CARDIAC CATH LAB PROCEDURE      [age] years of age   Betsy Amble KNEE SURGERY  1977    lt knee    LYMPH NODE DISSECTION         Social History:    Social History     Tobacco Use    Smoking status: Current Every Day Smoker     Packs/day: 0.50     Years: 25.00     Pack years: 12.50     Types: Cigarettes    Smokeless tobacco: Never Used   Substance Use Topics    Alcohol use: Yes     Comment: beer weekly                                Ready to quit: Not Answered  Counseling given: Not Answered      Vital Signs (Current):   Vitals:    04/01/19 1600 04/01/19 2030 04/01/19 2052 04/02/19 0815   BP:  121/76 121/76 118/68   Pulse:  73  94   Resp:  22  18   Temp:  97.3 °F (36.3 °C)  98.8 °F (37.1 °C)   TempSrc:  Oral  Oral   SpO2: 99% 97%  94%   Weight:       Height:                                                  BP Readings from Last 3 Encounters:   04/02/19 118/68   03/31/19 130/80   03/15/19 (!) 150/83       NPO Status: ASA 3     (Hx of HIV)  Induction: intravenous. Anesthetic plan and risks discussed with patient. Plan discussed with CRNA.                   Micaela Ibarra MD   4/2/2019

## 2021-07-19 NOTE — IP AVS SNAPSHOT
" Home Care Instructions                                                                                                                Name:Merna Ríos  Medical Record Number:[id number]  CSN: [id number]    YOB: 1990   Age: 26 y.o.  Sex: female  HT:1.753 m (5' 9.02\") WT: 68.2 kg (150 lb 5.7 oz)          Admit Date: 4/20/2017     Discharge Date:   Today's Date: 4/20/2017  Attending Doctor:  Kali Teixeira, M*                  Allergies:  Cephalexin; Bactrim ds; Other food; and Tape                Discharge Instructions         ACTIVITY: Rest and take it easy for the first 24 hours.  A responsible adult is recommended to remain with you during that time.  It is normal to feel sleepy.  We encourage you to not do anything that requires balance, judgment or coordination.    MILD FLU-LIKE SYMPTOMS ARE NORMAL. YOU MAY EXPERIENCE GENERALIZED MUSCLE ACHES, THROAT IRRITATION, HEADACHE AND/OR SOME NAUSEA.    FOR 24 HOURS DO NOT:  Drive, operate machinery or run household appliances.  Drink beer or alcoholic beverages.   Make important decisions or sign legal documents.    SPECIAL INSTRUCTIONS: Non weight bearing with crutches or scooter    DIET: To avoid nausea, slowly advance diet as tolerated, avoiding spicy or greasy foods for the first day.  Add more substantial food to your diet according to your physician's instructions.  Babies can be fed formula or breast milk as soon as they are hungry.  INCREASE FLUIDS AND FIBER TO AVOID CONSTIPATION.    SURGICAL DRESSING/BATHING: Per Dr Teixeira instructions    FOLLOW-UP APPOINTMENT:  A follow-up appointment should be arranged with your doctor in call to schedule    You should CALL YOUR PHYSICIAN if you develop:  Fever greater than 101 degrees F.  Pain not relieved by medication, or persistent nausea or vomiting.  Excessive bleeding (blood soaking through dressing) or unexpected drainage from the wound.  Extreme redness or swelling around the incision site, " Okay to stop taking now     drainage of pus or foul smelling drainage.  Inability to urinate or empty your bladder within 8 hours.  Problems with breathing or chest pain.    You should call 911 if you develop problems with breathing or chest pain.  If you are unable to contact your doctor or surgical center, you should go to the nearest emergency room or urgent care center.  Physician's telephone #: [phone]    If any questions arise, call your doctor.  If your doctor is not available, please feel free to call the Surgical Center at [phone].  The Center is open Monday through Friday from 7AM to 7PM.  You can also call the [employer] HOTLINE open 24 hours/day, 7 days/week and speak to a nurse at [phone], or toll free at [phone].    A registered nurse may call you a few days after your surgery to see how you are doing after your procedure.    MEDICATIONS: Resume taking daily medication.  Take prescribed pain medication with food.  If no medication is prescribed, you may take non-aspirin pain medication if needed.  PAIN MEDICATION CAN BE VERY CONSTIPATING.  Take a stool softener or laxative such as senokot, pericolace, or milk of magnesia if needed.    Prescription at home.  Last pain medication given at .    If your physician has prescribed pain medication that includes Acetaminophen (Tylenol), do not take additional Acetaminophen (Tylenol) while taking the prescribed medication.    Depression / Suicide Risk    As you are discharged from this [facility] facility, it is important to learn how to keep safe from harming yourself.    Recognize the warning signs:  · Abrupt changes in personality, positive or negative- including increase in energy   · Giving away possessions  · Change in eating patterns- significant weight changes-  positive or negative  · Change in sleeping patterns- unable to sleep or sleeping all the time   · Unwillingness or inability to communicate  · Depression  · Unusual sadness, discouragement and  loneliness  · Talk of wanting to die  · Neglect of personal appearance   · Rebelliousness- reckless behavior  · Withdrawal from people/activities they love  · Confusion- inability to concentrate     If you or a loved one observes any of these behaviors or has concerns about self-harm, here's what you can do:  · Talk about it- your feelings and reasons for harming yourself  · Remove any means that you might use to hurt yourself (examples: pills, rope, extension cords, firearm)  · Get professional help from the community (Mental Health, Substance Abuse, psychological counseling)  · Do not be alone:Call your Safe Contact- someone whom you trust who will be there for you.  · Call your local CRISIS HOTLINE 079-6730 or 900-345-1093  · Call your local Children's Mobile Crisis Response Team [place] (703) 042-4976 or www.Diabetica  · Call the toll free National Suicide Prevention Hotlines   · National Suicide Prevention Lifeline 148-367-TOFM (6540)  [place] [facility] Line Network 800-SUICIDE (516-2563)    Peripheral Nerve Block Discharge Instructions from Same Day Surgery and Inpatient :    What to Expect - Lower Extremity  · The block may cause you to experience numbness and weakness in your {LEG LOCATION [id number]} on the same side as your surgery  · Numbness, tingling and / or weakness are all normal. For some people, this may be an unpleasant sensation  · These issues will be resolved when the local anesthetic wears off   · You may experience numbness and tingling in your thigh on the same side as your surgery if the block medicine was injected at your groin area  · Numbness will make it difficult to walk  · You may have problems with balance and walking so be very careful   · Follow your surgeon's direction regarding weight bearing on your surgical limb  · Be very careful with your numb limb  Precautions  · The numbness may affect your balance  · Be careful when walking or moving around  · Your leg may be  "weak: be very careful putting weight on it  · If your surgeon did not specify a time, you should not bear weight for 24 hours  · Be sure to ask for help when you need it  · It is better to have help than to fall and hurt yourself    · Protect the limb like a baby  · Beware of exposing your limb to extreme heat or cold or trauma  · The limb may be injured without you noticing because it is numb  · Keep the limb elevated whenever possible  · Do not sleep on the limb  · Change the position of the limb regularly  · Avoid putting pressure on your surgical limb  Pain Control  · The initial block on the day of surgery will make your extremity feel \"numb\"  · Any consecutive injection including prior to discharge from the hospital will make your extremity feel \"numb\"  · You may feel an aching or burning when the local anesthesia starts to wear off  · Take pain pills as prescribed by your surgeon  · Call your surgeon or anesthesiologist if you do not have adequate pain control  ·        Medication List      CONTINUE taking these medications        Instructions    Morning Afternoon Evening Bedtime    BIOTIN 5000 5 MG Caps   Generic drug:  Biotin        Take 1 Cap by mouth every day.   Dose:  1 Cap                        Levonorgest-Eth Estrad 91-Day 0.1-0.02 & 0.01 MG Tabs        Take  by mouth every day.                        * MULTIVITAMIN ADULT PO        Take  by mouth every day.                        * EMERGEN-C PINK PO        Take  by mouth as needed.                        Non Formulary Request        every day. airbonne essentials vanilla protein shake                        SACCHAROMYCES BOULARDII PO        Take 1 Tab by mouth every day.   Dose:  1 Tab                        Vitamin C 1000 MG Tabs        Take  by mouth every day.                        * Notice:  This list has 2 medication(s) that are the same as other medications prescribed for you. Read the directions carefully, and ask your doctor or other care " provider to review them with you.            Medication Information     Above and/or attached are the medications your physician expects you to take upon discharge. Review all of your home medications and newly ordered medications with your doctor and/or pharmacist. Follow medication instructions as directed by your doctor and/or pharmacist. Please keep your medication list with you and share with your physician. Update the information when medications are discontinued, doses are changed, or new medications (including over-the-counter products) are added; and carry medication information at all times in the event of emergency situations.        Resources     Quit Smoking / Tobacco Use:    I understand the use of any tobacco products increases my chance of suffering from future heart disease or stroke and could cause other illnesses which may shorten my life. Quitting the use of tobacco products is the single most important thing I can do to improve my health. For further information on smoking / tobacco cessation call a Toll Free Quit Line at 1-634.445.9414 (*National Cancer [place]) or 1-727.811.4985 (American Lung Association) or you can access the web based program at www.lungSymetis.org.    [place] Tobacco Users Help Line:  (886) 332-1772       Toll Free: 1-251.449.6473  Quit Tobacco Program [facility] (784)140-6470    Crisis Hotline:    [place] Crisis Hotline:  1-499-WKHWUZT or 1-940.905.6043    [place] Crisis Hotline:    1-273.139.6769 or 144-291-0368    Discharge Survey:   Thank you for choosing [facility]. We hope we did everything we could to make your hospital stay a pleasant one. You may be receiving a survey and we would appreciate your time and participation in answering the questions. Your input is very valuable to us in our efforts to improve our service to our patients and their families.            Signatures     My signature on this form indicates that:    1. I acknowledge  receipt and understanding of these Home Care Instruction.  2. My questions regarding this information have been answered to my satisfaction.  3. I have formulated a plan with my discharge nurse to obtain my prescribed medications for home.    __________________________________      __________________________________                   Patient Signature                                 Guardian/Responsible Adult Signature      __________________________________                 __________       ________                       Nurse Signature                                               Date                 Time

## 2022-10-13 ENCOUNTER — OFFICE VISIT (OUTPATIENT)
Dept: URGENT CARE | Facility: CLINIC | Age: 32
End: 2022-10-13

## 2022-10-13 VITALS
TEMPERATURE: 98.8 F | HEART RATE: 76 BPM | RESPIRATION RATE: 16 BRPM | OXYGEN SATURATION: 98 % | BODY MASS INDEX: 22.25 KG/M2 | SYSTOLIC BLOOD PRESSURE: 114 MMHG | HEIGHT: 69 IN | WEIGHT: 150.2 LBS | DIASTOLIC BLOOD PRESSURE: 64 MMHG

## 2022-10-13 DIAGNOSIS — L73.9 FOLLICULITIS: ICD-10-CM

## 2022-10-13 PROCEDURE — 99202 OFFICE O/P NEW SF 15 MIN: CPT | Performed by: NURSE PRACTITIONER

## 2022-10-13 RX ORDER — ERGOCALCIFEROL 1.25 MG/1
CAPSULE ORAL
COMMUNITY

## 2022-10-13 RX ORDER — CHLORAL HYDRATE 500 MG
1000 CAPSULE ORAL
COMMUNITY

## 2022-10-13 RX ORDER — DOXYCYCLINE HYCLATE 100 MG
100 TABLET ORAL 2 TIMES DAILY
Qty: 14 TABLET | Refills: 0 | Status: SHIPPED | OUTPATIENT
Start: 2022-10-13 | End: 2022-10-20

## 2022-10-14 NOTE — PROGRESS NOTES
"Subjective     Merna Xena Ríos is a 31 y.o. female who presents with Rash (X2 days, Started on my stomach, has gone down to my legs, buttock. Benadryly has not helped with the rash. /X 1 week, Lumps in my arm pits. More prominent in my right arm pit.\")            Rash  This is a new problem. Episode onset: pt reports new onset of armpit rash that started 4 days ago. endorses soreness to the area, no pain. she has not shaved recently. no new deoderant, lotions, cosmetics. Location: admits her armpits, abdomen and groin are affected. The rash is characterized by redness. Associated with: she has recently been using a hot tub, noticed the rash after that. Past treatments include antihistamine. The treatment provided no relief.     Review of Systems   Skin:  Positive for rash.   All other systems reviewed and are negative.         Past Medical History:   Diagnosis Date    Cold 4/2017    Hemorrhagic disorder (HCC)     bloody noses from dryness    Indigestion     Infectious disease 1/2/17    Staph aureus infection 11/2016    Right foot      Past Surgical History:   Procedure Laterality Date    BUNIONECTOMY Left 4/20/2017    Procedure: BUNIONECTOMY - DISTAL;  Surgeon: Kali Teixeira M.D.;  Location: SURGERY [facility];  Service:     HARDWARE REMOVAL ORTHO Right 1/10/2017    Procedure: HARDWARE REMOVAL ORTHO - PLATE/SCREWS 1ST AND 5TH METATARSALS ;  Surgeon: Kali Teixeira M.D.;  Location: SURGERY [facility];  Service:     BUNIONECTOMY Right 10/13/2016    Right foot      Social History     Socioeconomic History    Marital status: Single     Spouse name: Not on file    Number of children: Not on file    Years of education: Not on file    Highest education level: Not on file   Occupational History    Not on file   Tobacco Use    Smoking status: Never    Smokeless tobacco: Never   Substance and Sexual Activity    Alcohol use: No     Comment: occ    Drug use: No    Sexual activity: Not on file " "  Other Topics Concern    Not on file   Social History Narrative    Not on file     Social Determinants of Health     Financial Resource Strain: Not on file   Food Insecurity: Not on file   Transportation Needs: Not on file   Physical Activity: Not on file   Stress: Not on file   Social Connections: Not on file   Intimate Partner Violence: Not on file   Housing Stability: Not on file       Objective     /64 (BP Location: Right arm, Patient Position: Sitting, BP Cuff Size: Adult)   Pulse 76   Temp 37.1 °C (98.8 °F) (Temporal)   Resp 16   Ht 1.753 m (5' 9\")   Wt 68.1 kg (150 lb 3.2 oz)   LMP 10/06/2022 (Approximate)   SpO2 98%   Breastfeeding No   BMI 22.18 kg/m²      Physical Exam  Vitals and nursing note reviewed.   Constitutional:       Appearance: Normal appearance. She is normal weight.   HENT:      Head: Normocephalic and atraumatic.      Nose: Nose normal.      Mouth/Throat:      Mouth: Mucous membranes are moist.      Pharynx: Oropharynx is clear.   Eyes:      Extraocular Movements: Extraocular movements intact.      Pupils: Pupils are equal, round, and reactive to light.   Cardiovascular:      Rate and Rhythm: Normal rate and regular rhythm.   Pulmonary:      Effort: Pulmonary effort is normal.   Musculoskeletal:         General: Normal range of motion.      Cervical back: Normal range of motion.   Skin:     General: Skin is warm and dry.      Capillary Refill: Capillary refill takes less than 2 seconds.          Neurological:      General: No focal deficit present.      Mental Status: She is alert and oriented to person, place, and time. Mental status is at baseline.   Psychiatric:         Mood and Affect: Mood normal.         Speech: Speech normal.         Thought Content: Thought content normal.         Judgment: Judgment normal.                           Assessment & Plan        1. Folliculitis  - doxycycline (VIBRAMYCIN) 100 MG Tab; Take 1 Tablet by mouth 2 times a day for 7 days.  " Dispense: 14 Tablet; Refill: 0       Take full course of abx  Keep skin clean and dry  Do not soak in hot tubs or baths  Do not shave affected areas until rash has cleared  Supportive care, differential diagnoses, and indications for immediate follow-up discussed with patient.    Pathogenesis of diagnosis discussed including typical length and natural progression.    Instructed to return to  or nearest emergency department if symptoms fail to improve, for any change in condition, further concerns, or new concerning symptoms.  Patient states understanding of the plan of care and discharge instructions.

## 2023-12-09 NOTE — PROGRESS NOTES
"S: Alert, no pain or complaints    O: Block worn off. NVI. No swelling. VAC just changed    Blood pressure 104/50, pulse 50, temperature 36.8 °C (98.3 °F), resp. rate 18, height 1.753 m (5' 9\"), weight 69.3 kg (152 lb 12.5 oz), last menstrual period 01/10/2017, SpO2 98 %.      No results for input(s): RBC, HEMOGLOBIN, HEMATOCRIT, PLATELETCT, PROTHROMBTM, APTT, INR, IRON, FERRITIN, TOTIRONBC in the last 72 hours.  Recent Labs      01/10/17   1343   SODIUM  136   POTASSIUM  4.2   CHLORIDE  102   CO2  25   GLUCOSE  111*   BUN  9   CREATININE  0.78   CALCIUM  8.8           A: Doing well post op. Will add ASA for DVT proph, she knows to continue at home.      P: Home when Inf Disease OKs. She has f/u with me on Jan 25th.      " Friend Contact: Pts friend, Bridgette ([phone]), contacted the SW and stated that she 
cannot drive on the freeway and neither can her . SW stated that the pt will be sent 
home on a taxi and the pts neighbor stated that she can let her into the house since she has 
a key. Alert and oriented to person, place and time

## (undated) DEVICE — GOWN SURGEONS X-LARGE - DISP. (30/CA)

## (undated) DEVICE — GLOVE, LITE (PAIR)

## (undated) DEVICE — MASK ANESTHESIA ADULT  - (100/CA)

## (undated) DEVICE — MASK, LARYNGEAL AIRWAY #4

## (undated) DEVICE — BURR EGG 4.0 ORAL

## (undated) DEVICE — WATER IRRIGATION STERILE 1000ML (12EA/CA)

## (undated) DEVICE — BANDAGE ELASTIC LATEX STERILE VELCRO 4 X 5 YDS (25EA/CA)

## (undated) DEVICE — SUTURE 3-0 MONOCRYL PLUS PS-2 - (12/BX)

## (undated) DEVICE — ELECTRODE 850 FOAM ADHESIVE - HYDROGEL RADIOTRNSPRNT (50/PK)

## (undated) DEVICE — SODIUM CHL IRRIGATION 0.9% 1000ML (12EA/CA)

## (undated) DEVICE — TUBE CONNECTING SUCTION - CLEAR PLASTIC STERILE 72 IN (50EA/CA)

## (undated) DEVICE — TOURNIQUET CUFF 34 X 4 ONE PORT DISP - STERILE (10/BX)

## (undated) DEVICE — CLOSURE WOUND 1/4 X 4 (STERI - STRIP) (50/BX 4BX/CA)

## (undated) DEVICE — BAG, SPONGE COUNT 50600

## (undated) DEVICE — CANISTER SUCTION RIGID RED 1500CC (40EA/CA)

## (undated) DEVICE — DRESSING KIT V.A.C. SENSA T.R.A.C. SMALL (10EA/CA)

## (undated) DEVICE — SUTURE 2-0 VICRYL PLUS SH - 8 X 18 INCH (12/BX)

## (undated) DEVICE — SPONGE GAUZESTER 4 X 4 4PLY - (128PK/CA)

## (undated) DEVICE — SUTURE 4-0 ETHILON PS-2 18 (12PK/BX)"

## (undated) DEVICE — GLOVE BIOGEL SZ 8 SURGICAL PF LTX - (50PR/BX 4BX/CA)

## (undated) DEVICE — GLOVE BIOGEL PI INDICATOR SZ 7.5 SURGICAL PF LF -(50/BX 4BX/CA)

## (undated) DEVICE — HUMID-VENT HEAT AND MOISTURE EXCHANGE- (50/BX)

## (undated) DEVICE — HEAD HOLDER JUNIOR/ADULT

## (undated) DEVICE — GOWN WARMING STANDARD FLEX - (30/CA)

## (undated) DEVICE — SENSOR SPO2 NEO LNCS ADHESIVE (20/BX) SEE USER NOTES

## (undated) DEVICE — ELECTRODE DUAL RETURN W/ CORD - (50/PK)

## (undated) DEVICE — SUCTION INSTRUMENT YANKAUER BULBOUS TIP W/O VENT (50EA/CA)

## (undated) DEVICE — KIT ANESTHESIA W/CIRCUIT & 3/LT BAG W/FILTER (20EA/CA)

## (undated) DEVICE — CHLORAPREP 26 ML APPLICATOR - ORANGE TINT(25/CA)

## (undated) DEVICE — BANDAGE STERILE 2 IN X 75 IN (12EA/BX 8BX/CA)

## (undated) DEVICE — BANDAGE ELASTIC 2 X 5 YDS - STERILE VELCRO (25/CA) LATEX

## (undated) DEVICE — SLEEVE, SEQUENTIAL CALF REG

## (undated) DEVICE — SYRINGE 10 ML CONTROL LL (25EA/BX 4BX/CA)

## (undated) DEVICE — LACTATED RINGERS INJ 1000 ML - (14EA/CA 60CA/PF)

## (undated) DEVICE — BANDAGEESMARK  4X9' BLUE LF - 20/CS"

## (undated) DEVICE — SUTURE GENERAL

## (undated) DEVICE — BLOCK

## (undated) DEVICE — MASK AIRWAY SIZE 4 UNIQUE SILICON (10EA/BX)

## (undated) DEVICE — KIT ROOM DECONTAMINATION

## (undated) DEVICE — PROTECTOR ULNA NERVE - (36PR/CA)

## (undated) DEVICE — GLOVE BIOGEL SZ 7 SURGICAL PF LTX - (50PR/BX 4BX/CA)

## (undated) DEVICE — BLADE OSCILLATING 9MMX24.6MMX0.4MM LIKE STRYKER 2296-3-111

## (undated) DEVICE — SUTURE 3-0 ETHILON FS-1 - (36/BX) 30 INCH

## (undated) DEVICE — SPONGE GAUZE STER 4X4 8-PL - (2/PK 50PK/BX 12BX/CS)

## (undated) DEVICE — GLOVE BIOGEL PI ULTRATOUCH SZ 7.5 SURGICAL PF LF -(50/BX 4BX/CA)

## (undated) DEVICE — BLADE SURGICAL #10 - (50/BX)

## (undated) DEVICE — PACK LOWER EXTREMITY - (2/CA)

## (undated) DEVICE — SUTURE 3-0 VICRYL PLUS SH - 8X 18 INCH (12/BX)

## (undated) DEVICE — DRESSING XEROFORM 1X8 - (50/BX 4BX/CA)

## (undated) DEVICE — DRAPE LARGE 3 QUARTER - (20/CA)

## (undated) DEVICE — SUTURE 3-0 VICRYL PLUS RB-1 - 8 X 18 INCH (12/BX)